# Patient Record
Sex: FEMALE | Race: WHITE | NOT HISPANIC OR LATINO | URBAN - METROPOLITAN AREA
[De-identification: names, ages, dates, MRNs, and addresses within clinical notes are randomized per-mention and may not be internally consistent; named-entity substitution may affect disease eponyms.]

---

## 2017-01-10 ENCOUNTER — ALLSCRIPTS OFFICE VISIT (OUTPATIENT)
Dept: OTHER | Facility: OTHER | Age: 47
End: 2017-01-10

## 2017-01-11 ENCOUNTER — ANESTHESIA EVENT (OUTPATIENT)
Dept: PERIOP | Facility: HOSPITAL | Age: 47
End: 2017-01-11
Payer: COMMERCIAL

## 2017-01-11 ENCOUNTER — HOSPITAL ENCOUNTER (OUTPATIENT)
Facility: HOSPITAL | Age: 47
Setting detail: OUTPATIENT SURGERY
Discharge: HOME | End: 2017-01-11
Attending: SPECIALIST | Admitting: SPECIALIST
Payer: COMMERCIAL

## 2017-01-11 ENCOUNTER — ANESTHESIA (OUTPATIENT)
Dept: PERIOP | Facility: HOSPITAL | Age: 47
End: 2017-01-11
Payer: COMMERCIAL

## 2017-01-11 VITALS
SYSTOLIC BLOOD PRESSURE: 108 MMHG | BODY MASS INDEX: 28.32 KG/M2 | TEMPERATURE: 97.4 F | DIASTOLIC BLOOD PRESSURE: 58 MMHG | HEIGHT: 61 IN | WEIGHT: 150 LBS | OXYGEN SATURATION: 100 % | HEART RATE: 71 BPM | RESPIRATION RATE: 18 BRPM

## 2017-01-11 DIAGNOSIS — L72.9 FOLLICULAR CYST OF SKIN: ICD-10-CM

## 2017-01-11 LAB — EXT PREGNANCY TEST URINE: NORMAL

## 2017-01-11 PROCEDURE — 87070 CULTURE OTHR SPECIMN AEROBIC: CPT | Performed by: SPECIALIST

## 2017-01-11 PROCEDURE — 87205 SMEAR GRAM STAIN: CPT | Performed by: SPECIALIST

## 2017-01-11 PROCEDURE — 87147 CULTURE TYPE IMMUNOLOGIC: CPT | Performed by: SPECIALIST

## 2017-01-11 PROCEDURE — 87077 CULTURE AEROBIC IDENTIFY: CPT | Performed by: SPECIALIST

## 2017-01-11 PROCEDURE — 81025 URINE PREGNANCY TEST: CPT | Performed by: SPECIALIST

## 2017-01-11 PROCEDURE — 87176 TISSUE HOMOGENIZATION CULTR: CPT | Performed by: SPECIALIST

## 2017-01-11 PROCEDURE — 87186 SC STD MICRODIL/AGAR DIL: CPT | Performed by: SPECIALIST

## 2017-01-11 PROCEDURE — 87075 CULTR BACTERIA EXCEPT BLOOD: CPT | Performed by: SPECIALIST

## 2017-01-11 RX ORDER — TERBINAFINE HYDROCHLORIDE 250 MG/1
250 TABLET ORAL DAILY
COMMUNITY

## 2017-01-11 RX ORDER — FENTANYL CITRATE/PF 50 MCG/ML
25 SYRINGE (ML) INJECTION
Status: DISCONTINUED | OUTPATIENT
Start: 2017-01-11 | End: 2017-01-11 | Stop reason: HOSPADM

## 2017-01-11 RX ORDER — COVID-19 ANTIGEN TEST
KIT MISCELLANEOUS
COMMUNITY

## 2017-01-11 RX ORDER — CEFADROXIL 500 MG/1
500 CAPSULE ORAL 2 TIMES DAILY
COMMUNITY

## 2017-01-11 RX ORDER — OXYCODONE HYDROCHLORIDE AND ACETAMINOPHEN 5; 325 MG/1; MG/1
1 TABLET ORAL EVERY 4 HOURS PRN
Status: DISCONTINUED | OUTPATIENT
Start: 2017-01-11 | End: 2017-01-11 | Stop reason: HOSPADM

## 2017-01-11 RX ORDER — ATORVASTATIN CALCIUM 10 MG/1
10 TABLET, FILM COATED ORAL DAILY
COMMUNITY

## 2017-01-11 RX ORDER — MIDAZOLAM HYDROCHLORIDE 1 MG/ML
INJECTION INTRAMUSCULAR; INTRAVENOUS AS NEEDED
Status: DISCONTINUED | OUTPATIENT
Start: 2017-01-11 | End: 2017-01-11 | Stop reason: SURG

## 2017-01-11 RX ORDER — OXYCODONE HYDROCHLORIDE AND ACETAMINOPHEN 5; 325 MG/1; MG/1
1 TABLET ORAL EVERY 4 HOURS PRN
Qty: 40 TABLET | Refills: 0 | Status: SHIPPED | OUTPATIENT
Start: 2017-01-11 | End: 2017-01-21

## 2017-01-11 RX ORDER — FENTANYL CITRATE 50 UG/ML
INJECTION, SOLUTION INTRAMUSCULAR; INTRAVENOUS AS NEEDED
Status: DISCONTINUED | OUTPATIENT
Start: 2017-01-11 | End: 2017-01-11 | Stop reason: SURG

## 2017-01-11 RX ORDER — BUPIVACAINE HYDROCHLORIDE 5 MG/ML
INJECTION, SOLUTION PERINEURAL AS NEEDED
Status: DISCONTINUED | OUTPATIENT
Start: 2017-01-11 | End: 2017-01-11 | Stop reason: HOSPADM

## 2017-01-11 RX ADMIN — OXYCODONE HYDROCHLORIDE AND ACETAMINOPHEN 1 TABLET: 5; 325 TABLET ORAL at 14:49

## 2017-01-11 RX ADMIN — CEFAZOLIN SODIUM 1000 MG: 1 SOLUTION INTRAVENOUS at 14:11

## 2017-01-11 RX ADMIN — MIDAZOLAM HYDROCHLORIDE 2 MG: 1 INJECTION, SOLUTION INTRAMUSCULAR; INTRAVENOUS at 14:00

## 2017-01-11 RX ADMIN — FENTANYL CITRATE 50 MCG: 50 INJECTION, SOLUTION INTRAMUSCULAR; INTRAVENOUS at 14:05

## 2017-01-11 RX ADMIN — FENTANYL CITRATE 50 MCG: 50 INJECTION, SOLUTION INTRAMUSCULAR; INTRAVENOUS at 14:00

## 2017-01-13 LAB — BACTERIA SPEC ANAEROBE CULT: NORMAL

## 2017-01-14 LAB
BACTERIA TISS AEROBE CULT: NORMAL
GRAM STN SPEC: NORMAL
GRAM STN SPEC: NORMAL

## 2017-01-16 ENCOUNTER — ALLSCRIPTS OFFICE VISIT (OUTPATIENT)
Dept: OTHER | Facility: OTHER | Age: 47
End: 2017-01-16

## 2017-01-23 ENCOUNTER — ALLSCRIPTS OFFICE VISIT (OUTPATIENT)
Dept: OTHER | Facility: OTHER | Age: 47
End: 2017-01-23

## 2017-02-10 ENCOUNTER — ALLSCRIPTS OFFICE VISIT (OUTPATIENT)
Dept: OTHER | Facility: OTHER | Age: 47
End: 2017-02-10

## 2017-02-23 ENCOUNTER — ALLSCRIPTS OFFICE VISIT (OUTPATIENT)
Dept: OTHER | Facility: OTHER | Age: 47
End: 2017-02-23

## 2017-04-14 ENCOUNTER — ALLSCRIPTS OFFICE VISIT (OUTPATIENT)
Dept: OTHER | Facility: OTHER | Age: 47
End: 2017-04-14

## 2017-04-20 ENCOUNTER — ALLSCRIPTS OFFICE VISIT (OUTPATIENT)
Dept: OTHER | Facility: OTHER | Age: 47
End: 2017-04-20

## 2017-05-18 ENCOUNTER — ALLSCRIPTS OFFICE VISIT (OUTPATIENT)
Dept: OTHER | Facility: OTHER | Age: 47
End: 2017-05-18

## 2017-05-22 ENCOUNTER — ALLSCRIPTS OFFICE VISIT (OUTPATIENT)
Dept: OTHER | Facility: OTHER | Age: 47
End: 2017-05-22

## 2017-06-15 ENCOUNTER — ALLSCRIPTS OFFICE VISIT (OUTPATIENT)
Dept: OTHER | Facility: OTHER | Age: 47
End: 2017-06-15

## 2018-01-12 VITALS
DIASTOLIC BLOOD PRESSURE: 88 MMHG | HEIGHT: 61 IN | BODY MASS INDEX: 29.07 KG/M2 | RESPIRATION RATE: 16 BRPM | SYSTOLIC BLOOD PRESSURE: 132 MMHG | WEIGHT: 154 LBS | HEART RATE: 72 BPM

## 2018-01-12 VITALS
HEIGHT: 61 IN | DIASTOLIC BLOOD PRESSURE: 86 MMHG | WEIGHT: 150 LBS | BODY MASS INDEX: 28.32 KG/M2 | RESPIRATION RATE: 16 BRPM | SYSTOLIC BLOOD PRESSURE: 139 MMHG | HEART RATE: 78 BPM

## 2018-01-13 VITALS
OXYGEN SATURATION: 99 % | HEART RATE: 98 BPM | BODY MASS INDEX: 28.42 KG/M2 | HEIGHT: 61 IN | TEMPERATURE: 98.1 F | WEIGHT: 150.5 LBS | SYSTOLIC BLOOD PRESSURE: 118 MMHG | DIASTOLIC BLOOD PRESSURE: 80 MMHG

## 2018-01-13 VITALS
HEIGHT: 61 IN | HEART RATE: 53 BPM | SYSTOLIC BLOOD PRESSURE: 118 MMHG | TEMPERATURE: 98.2 F | BODY MASS INDEX: 29.07 KG/M2 | WEIGHT: 154 LBS | OXYGEN SATURATION: 96 % | DIASTOLIC BLOOD PRESSURE: 72 MMHG

## 2018-01-14 VITALS
HEART RATE: 127 BPM | DIASTOLIC BLOOD PRESSURE: 86 MMHG | TEMPERATURE: 98.6 F | WEIGHT: 150.5 LBS | OXYGEN SATURATION: 99 % | SYSTOLIC BLOOD PRESSURE: 140 MMHG | BODY MASS INDEX: 28.42 KG/M2 | HEIGHT: 61 IN

## 2018-01-14 VITALS
DIASTOLIC BLOOD PRESSURE: 83 MMHG | BODY MASS INDEX: 29.07 KG/M2 | HEART RATE: 87 BPM | RESPIRATION RATE: 16 BRPM | WEIGHT: 154 LBS | HEIGHT: 61 IN | SYSTOLIC BLOOD PRESSURE: 133 MMHG

## 2018-01-14 VITALS
TEMPERATURE: 98 F | OXYGEN SATURATION: 97 % | DIASTOLIC BLOOD PRESSURE: 82 MMHG | HEIGHT: 61 IN | HEART RATE: 98 BPM | WEIGHT: 150.5 LBS | SYSTOLIC BLOOD PRESSURE: 136 MMHG | BODY MASS INDEX: 28.42 KG/M2

## 2018-01-15 VITALS
BODY MASS INDEX: 29.17 KG/M2 | HEIGHT: 61 IN | OXYGEN SATURATION: 97 % | DIASTOLIC BLOOD PRESSURE: 76 MMHG | SYSTOLIC BLOOD PRESSURE: 132 MMHG | HEART RATE: 87 BPM | WEIGHT: 154.5 LBS | TEMPERATURE: 98.6 F

## 2020-12-20 ENCOUNTER — HOSPITAL ENCOUNTER (EMERGENCY)
Facility: HOSPITAL | Age: 50
Discharge: HOME | End: 2020-12-20
Attending: EMERGENCY MEDICINE | Admitting: EMERGENCY MEDICINE
Payer: COMMERCIAL

## 2020-12-20 ENCOUNTER — APPOINTMENT (EMERGENCY)
Dept: RADIOLOGY | Facility: HOSPITAL | Age: 50
End: 2020-12-20
Payer: COMMERCIAL

## 2020-12-20 VITALS
SYSTOLIC BLOOD PRESSURE: 157 MMHG | RESPIRATION RATE: 18 BRPM | BODY MASS INDEX: 37.03 KG/M2 | HEART RATE: 95 BPM | DIASTOLIC BLOOD PRESSURE: 80 MMHG | TEMPERATURE: 98 F | OXYGEN SATURATION: 100 % | WEIGHT: 196 LBS

## 2020-12-20 DIAGNOSIS — S06.0X0A CONCUSSION WITHOUT LOSS OF CONSCIOUSNESS, INITIAL ENCOUNTER: Primary | ICD-10-CM

## 2020-12-20 DIAGNOSIS — S01.01XA LACERATION OF SCALP, INITIAL ENCOUNTER: ICD-10-CM

## 2020-12-20 PROCEDURE — 99283 EMERGENCY DEPT VISIT LOW MDM: CPT | Performed by: EMERGENCY MEDICINE

## 2020-12-20 PROCEDURE — 99284 EMERGENCY DEPT VISIT MOD MDM: CPT

## 2020-12-20 PROCEDURE — 12002 RPR S/N/AX/GEN/TRNK2.6-7.5CM: CPT | Performed by: EMERGENCY MEDICINE

## 2020-12-20 PROCEDURE — G1004 CDSM NDSC: HCPCS

## 2020-12-20 PROCEDURE — 90471 IMMUNIZATION ADMIN: CPT

## 2020-12-20 PROCEDURE — 90715 TDAP VACCINE 7 YRS/> IM: CPT | Performed by: EMERGENCY MEDICINE

## 2020-12-20 PROCEDURE — 70450 CT HEAD/BRAIN W/O DYE: CPT

## 2020-12-20 RX ORDER — LIDOCAINE HYDROCHLORIDE AND EPINEPHRINE 10; 10 MG/ML; UG/ML
5 INJECTION, SOLUTION INFILTRATION; PERINEURAL ONCE
Status: COMPLETED | OUTPATIENT
Start: 2020-12-20 | End: 2020-12-20

## 2020-12-20 RX ORDER — NORTRIPTYLINE HYDROCHLORIDE 50 MG/1
50 CAPSULE ORAL
COMMUNITY

## 2020-12-20 RX ORDER — HYDROCODONE BITARTRATE AND ACETAMINOPHEN 5; 325 MG/1; MG/1
1 TABLET ORAL ONCE
Status: COMPLETED | OUTPATIENT
Start: 2020-12-20 | End: 2020-12-20

## 2020-12-20 RX ORDER — TOPIRAMATE 25 MG/1
25 TABLET ORAL 2 TIMES DAILY
COMMUNITY

## 2020-12-20 RX ADMIN — HYDROCODONE BITARTRATE AND ACETAMINOPHEN 1 TABLET: 5; 325 TABLET ORAL at 18:54

## 2020-12-20 RX ADMIN — LIDOCAINE HYDROCHLORIDE,EPINEPHRINE BITARTRATE 5 ML: 10; .01 INJECTION, SOLUTION INFILTRATION; PERINEURAL at 18:08

## 2020-12-20 RX ADMIN — TETANUS TOXOID, REDUCED DIPHTHERIA TOXOID AND ACELLULAR PERTUSSIS VACCINE, ADSORBED 0.5 ML: 5; 2.5; 8; 8; 2.5 SUSPENSION INTRAMUSCULAR at 18:02

## 2021-02-10 ENCOUNTER — TRANSCRIBE ORDERS (OUTPATIENT)
Dept: ADMINISTRATIVE | Facility: HOSPITAL | Age: 51
End: 2021-02-10

## 2021-02-10 DIAGNOSIS — R58 BLEEDING: Primary | ICD-10-CM

## 2021-02-10 DIAGNOSIS — N93.9 VAGINAL BLEEDING: ICD-10-CM

## 2021-02-11 ENCOUNTER — HOSPITAL ENCOUNTER (OUTPATIENT)
Dept: RADIOLOGY | Facility: HOSPITAL | Age: 51
Discharge: HOME | End: 2021-02-11
Payer: COMMERCIAL

## 2021-02-11 DIAGNOSIS — N93.9 VAGINAL BLEEDING: ICD-10-CM

## 2021-02-11 PROCEDURE — 76830 TRANSVAGINAL US NON-OB: CPT

## 2021-02-11 PROCEDURE — 76856 US EXAM PELVIC COMPLETE: CPT

## 2023-06-13 ENCOUNTER — TELEPHONE (OUTPATIENT)
Dept: NEUROLOGY | Facility: CLINIC | Age: 53
End: 2023-06-13

## 2023-06-28 ENCOUNTER — OFFICE VISIT (OUTPATIENT)
Dept: NEUROLOGY | Facility: CLINIC | Age: 53
End: 2023-06-28
Payer: COMMERCIAL

## 2023-06-28 VITALS
BODY MASS INDEX: 31.75 KG/M2 | TEMPERATURE: 97.6 F | DIASTOLIC BLOOD PRESSURE: 70 MMHG | HEART RATE: 92 BPM | OXYGEN SATURATION: 97 % | HEIGHT: 61 IN | SYSTOLIC BLOOD PRESSURE: 118 MMHG | WEIGHT: 168.2 LBS

## 2023-06-28 DIAGNOSIS — G44.86 CERVICOGENIC HEADACHE: Primary | ICD-10-CM

## 2023-06-28 DIAGNOSIS — G43.009 ATYPICAL MIGRAINE: ICD-10-CM

## 2023-06-28 DIAGNOSIS — M50.30 DDD (DEGENERATIVE DISC DISEASE), CERVICAL: ICD-10-CM

## 2023-06-28 DIAGNOSIS — M25.552 LEFT HIP PAIN: ICD-10-CM

## 2023-06-28 PROCEDURE — 99205 OFFICE O/P NEW HI 60 MIN: CPT | Performed by: PSYCHIATRY & NEUROLOGY

## 2023-06-28 RX ORDER — ERENUMAB-AOOE 140 MG/ML
140 INJECTION, SOLUTION SUBCUTANEOUS
Qty: 1 ML | Refills: 3 | Status: SHIPPED | OUTPATIENT
Start: 2023-06-28

## 2023-06-28 RX ORDER — DULOXETINE 40 MG/1
CAPSULE, DELAYED RELEASE ORAL
COMMUNITY
Start: 2023-06-08

## 2023-06-28 NOTE — PROGRESS NOTES
Outpatient Neurology History and Physical  Dexter Marx  [id number]  47 y o   1970          Consult: Yes    No primary care provider on file  Chief Complaint   Patient presents with   • Tension headache           History Obtained from: patient     HPI:       Dexter Marx is a 48 yo F that presents with cc of tension headaches  She describes her headache as originating from base of neck to all over head  Pain is pressure type with shooting quality in between  There is no associated nausea, vomiting  There is no associated light or noise sensitivity  She also reports associated postural dizziness  When she bends down, her headache exaggerates  She reports headache on over 15 days a month  She has been on topamax for over 4 years and is not effective anymore  Denies any food or smells as triggers  She denies any extremity paresthesia or weakness  She has done PT for 6 months without much benefit  Says she has tried muscle relaxants in past and doesn't remember them to be effective  She's had epidural injections in past but says they were ineffective  She can't remember when the last time she had cervical spine MRI  She's had MRI brain and MRA and both were normal      She's on sinemet for RLS  She is on pamelor 50mg qhs and duloxetine 40mg daily but is unsure why she's on them  Her mother had migraines  Her maternal aunt had migraines  She also reports left hip referred pain in left groin         Past Medical History:   Diagnosis Date   • Chronic pain     head and neck    • Tension headache                Current Outpatient Medications on File Prior to Visit   Medication Sig Dispense Refill   • carbidopa-levodopa (SINEMET)  mg per tablet Take 1 tablet by mouth daily     • cefadroxil (DURICEF) 500 mg capsule Take 500 mg by mouth 2 (two) times a day     • DULoxetine HCl 40 MG CPEP      • Naproxen Sodium (ALEVE) 220 MG CAPS Take by mouth if needed     • nortriptyline (PAMELOR) 50 mg capsule Take 50 mg by mouth daily at bedtime     • topiramate (TOPAMAX) 200 MG tablet Take 200 mg by mouth daily     • atorvastatin (LIPITOR) 10 mg tablet Take 10 mg by mouth daily     • ciclopirox (LOPROX) 0 77 % cream Apply topically 2 (two) times a day     • terbinafine (LamISIL) 250 mg tablet Take 250 mg by mouth daily     • topiramate (TOPAMAX) 25 mg tablet Take 25 mg by mouth 2 (two) times a day (Patient not taking: Reported on 6/28/2023)       No current facility-administered medications on file prior to visit         No Known Allergies      Family History   Problem Relation Age of Onset   • Dementia Mother    • No Known Problems Father                 Past Surgical History:   Procedure Laterality Date   • PILONIDAL CYST DRAINAGE     • HI I&D SOFT TISSUE ABSCESS SUBFASCIAL N/A 1/11/2017    Procedure: INCISION AND DRAINAGE (I&D) BUTTOCK;  Surgeon: Yves Villarreal MD;  Location: [address] [place] [address];  Service: General   • SHOULDER ARTHROSCOPY W/ ROTATOR CUFF REPAIR Left            Social History     Socioeconomic History   • Marital status: /Civil Union     Spouse name: Not on file   • Number of children: Not on file   • Years of education: Not on file   • Highest education level: Not on file   Occupational History   • Not on file   Tobacco Use   • Smoking status: Former     Packs/day: 0 50     Years: 20 00     Total pack years: 10 00     Types: Cigarettes   • Smokeless tobacco: Never   Vaping Use   • Vaping Use: Every day   • Substances: Flavoring   Substance and Sexual Activity   • Alcohol use: No   • Drug use: No   • Sexual activity: Not on file   Other Topics Concern   • Not on file   Social History Narrative   • Not on file     Social Determinants of Health     Financial Resource Strain: Not on file   Food Insecurity: Not on file   Transportation Needs: Not on file   Physical Activity: Not on file   Stress: Not on file   Social Connections: Not on file   Intimate Partner Violence: Not on file   Housing Stability: "Not on file       Review of Systems  Refer to positive review of systems in HPI  Constitutional- No fever  Eyes- No visual change  ENT- Hearing normal  CV- No chest pain  Resp- No Shortness of breath  GI- No diarrhea  - Bladder normal  MS- No Arthritis   Skin- No rash  Psych- No depression  Endo- No DM  Heme- No nodes    PHYSICAL EXAM:    Vitals:    06/28/23 1459   BP: 118/70   BP Location: Left arm   Patient Position: Sitting   Cuff Size: Standard   Pulse: 92   Temp: 97 6 °F (36 4 °C)   TempSrc: Tympanic   SpO2: 97%   Weight: 76 3 kg (168 lb 3 2 oz)   Height: 5' 1\" (1 549 m)         Appearance: No Acute Distress  Ophthalmoscopic: Disc Flat, Normal fundus  Carotid/Heart/Peripheral Vascular: No Bruits, RRR  Orientation: Awake, Alert, and Oriented x 3  Mental status:  Memory: Registation 3/3 Recall 3/3  Attention: Normal  Knowledge: Appropriate  Language: No aphasia  Speech: No dysarthria  Cranial Nerves:  2 No Visual Defect on Confrontation; Pupils round, equal, reactive to light  3,4,6 Extraocular Movements Intact; no nystagmus  5 Facial Sensation Intact  7 No facial asymmetry  8 Intact hearing  9,10 Palate symmetric, normal gag  11 Good shoulder shrug  12 Tongue Midline  Gait: Stable, No ataxia, can perform tandem walking  Coordination: No ataxia with finger to nose testing and heel to shin testing  Sensory: Intact, Symmetric to Pinprick, Light Touch, Vibration, and Joint Position  Muscle Tone: Normal  Muscle exam  Arm Right Left Leg Right Left   Deltoid 5/5 5/5 Iliopsoas 5/5 5/5   Biceps 5/5 5/5 Quads 5/5 5/5   Triceps 5/5 5/5 Hamstrings 5/5 5/5   Wrist Extension 5/5 5/5 Ankle Dorsi Flexion 5/5 5/5   Wrist Flexion 5/5 5/5 Ankle Plantar Flexion 5/5 5/5   Interossei 5/5 5/5 Ankle Eversion 5/5 5/5   APB 5/5 5/5 Ankle Inversion 5/5 5/5       Reflexes   RJ BJ TJ KJ AJ Plantars Sauceda's   Right 2+ 2+ 2+ 2+ 2+ Downgoing Not present   Left 2+ 2+ 2+ 2+ 2+ Downgoing Not present         Personal review of        Mri brain " and mra    Assessment/Plan:     1  Cervicogenic headache  MRI cervical spine wo contrast      2  DDD (degenerative disc disease), cervical  MRI cervical spine wo contrast      3  Atypical migraine  Erenumab-aooe (Aimovig) 140 MG/ML SOAJ      4  Left hip pain  XR hip/pelv 2-3 vws left if performed          Patient suffers from frequent headaches that are not quite migraine like but are disabling  She's already on topamax and pamelor which are not effective  Will place her on aimovig monthly  Will obtain MRI cervical spine to r/o any pathology of neck  For her recent left hip pain, will obtain x ray  Counseling Documentation:  The patient and/or patient's family were  counseled regarding diagnostic results  Instructions for management,risk factor reductions,prognosis of disease were discussed  Patient and family were educated regarding impressions,risks and benefits of treatment options,importance of compliance with treatment  Total time of encounter: 60 min   More than 50% of time was spent in counseling and coordination of care of patient  FLORIAN Carbajal    [facility]  [phone]  [place] [address]

## 2023-06-28 NOTE — LETTER
June 28, 2023     Tj Thompson, [address]    Patient: Daniela Diaz   YOB: 1970   Date of Visit: 6/28/2023       Dear Dr Myriam Negron: Thank you for referring Daniela Diaz to me for evaluation  Below are my notes for this consultation  If you have questions, please do not hesitate to call me  I look forward to following your patient along with you  Sincerely,        Lynne Zambrano MD        CC: No Recipients    Lynne Zambrano MD  6/28/2023  3:37 PM  Incomplete  Outpatient Neurology History and Physical  Daniela Diaz  [id number]  47 y o   1970          Consult: Yes    No primary care provider on file  Chief Complaint   Patient presents with   • Tension headache           History Obtained from: patient     HPI:       Daniela Diaz is a 46 yo F that presents with cc of tension headaches  She describes her headache as originating from base of neck to all over head  Pain is pressure type with shooting quality in between  There is no associated nausea, vomiting  There is no associated light or noise sensitivity  She also reports associated postural dizziness  When she bends down, her headache exaggerates  She reports headache on over 15 days a month  She has been on topamax for over 4 years and is not effective anymore  Denies any food or smells as triggers  She denies any extremity paresthesia or weakness  She has done PT for 6 months without much benefit  Says she has tried muscle relaxants in past and doesn't remember them to be effective  She's had epidural injections in past but says they were ineffective  She can't remember when the last time she had cervical spine MRI  She's had MRI brain and MRA and both were normal      She's on sinemet for RLS  She is on pamelor 50mg qhs and duloxetine 40mg daily but is unsure why she's on them  Her mother had migraines  Her maternal aunt had migraines       She also reports left hip referred pain in left groin  Past Medical History:   Diagnosis Date   • Chronic pain     head and neck    • Tension headache                Current Outpatient Medications on File Prior to Visit   Medication Sig Dispense Refill   • carbidopa-levodopa (SINEMET)  mg per tablet Take 1 tablet by mouth daily     • cefadroxil (DURICEF) 500 mg capsule Take 500 mg by mouth 2 (two) times a day     • DULoxetine HCl 40 MG CPEP      • Naproxen Sodium (ALEVE) 220 MG CAPS Take by mouth if needed     • nortriptyline (PAMELOR) 50 mg capsule Take 50 mg by mouth daily at bedtime     • topiramate (TOPAMAX) 200 MG tablet Take 200 mg by mouth daily     • atorvastatin (LIPITOR) 10 mg tablet Take 10 mg by mouth daily     • ciclopirox (LOPROX) 0 77 % cream Apply topically 2 (two) times a day     • terbinafine (LamISIL) 250 mg tablet Take 250 mg by mouth daily     • topiramate (TOPAMAX) 25 mg tablet Take 25 mg by mouth 2 (two) times a day (Patient not taking: Reported on 6/28/2023)       No current facility-administered medications on file prior to visit         No Known Allergies      Family History   Problem Relation Age of Onset   • Dementia Mother    • No Known Problems Father                 Past Surgical History:   Procedure Laterality Date   • PILONIDAL CYST DRAINAGE     • MO I&D SOFT TISSUE ABSCESS SUBFASCIAL N/A 1/11/2017    Procedure: INCISION AND DRAINAGE (I&D) BUTTOCK;  Surgeon: Den Amaral MD;  Location: [address] [place] [address];  Service: General   • SHOULDER ARTHROSCOPY W/ ROTATOR CUFF REPAIR Left            Social History     Socioeconomic History   • Marital status: /Civil Union     Spouse name: Not on file   • Number of children: Not on file   • Years of education: Not on file   • Highest education level: Not on file   Occupational History   • Not on file   Tobacco Use   • Smoking status: Former     Packs/day: 0 50     Years: 20 00     Total pack years: 10 00     Types: Cigarettes   • Smokeless tobacco: Never   Vaping Use "  • Vaping Use: Every day   • Substances: Flavoring   Substance and Sexual Activity   • Alcohol use: No   • Drug use: No   • Sexual activity: Not on file   Other Topics Concern   • Not on file   Social History Narrative   • Not on file     Social Determinants of Health     Financial Resource Strain: Not on file   Food Insecurity: Not on file   Transportation Needs: Not on file   Physical Activity: Not on file   Stress: Not on file   Social Connections: Not on file   Intimate Partner Violence: Not on file   Housing Stability: Not on file       Review of Systems  Refer to positive review of systems in HPI  Constitutional- No fever  Eyes- No visual change  ENT- Hearing normal  CV- No chest pain  Resp- No Shortness of breath  GI- No diarrhea  - Bladder normal  MS- No Arthritis   Skin- No rash  Psych- No depression  Endo- No DM  Heme- No nodes    PHYSICAL EXAM:    Vitals:    06/28/23 1459   BP: 118/70   BP Location: Left arm   Patient Position: Sitting   Cuff Size: Standard   Pulse: 92   Temp: 97 6 °F (36 4 °C)   TempSrc: Tympanic   SpO2: 97%   Weight: 76 3 kg (168 lb 3 2 oz)   Height: 5' 1\" (1 549 m)         Appearance: No Acute Distress  Ophthalmoscopic: Disc Flat, Normal fundus  Carotid/Heart/Peripheral Vascular: No Bruits, RRR  Orientation: Awake, Alert, and Oriented x 3  Mental status:  Memory: Registation 3/3 Recall 3/3  Attention: Normal  Knowledge: Appropriate  Language: No aphasia  Speech: No dysarthria  Cranial Nerves:  2 No Visual Defect on Confrontation; Pupils round, equal, reactive to light  3,4,6 Extraocular Movements Intact; no nystagmus  5 Facial Sensation Intact  7 No facial asymmetry  8 Intact hearing  9,10 Palate symmetric, normal gag  11 Good shoulder shrug  12 Tongue Midline  Gait: Stable, No ataxia, can perform tandem walking  Coordination: No ataxia with finger to nose testing and heel to shin testing  Sensory: Intact, Symmetric to Pinprick, Light Touch, Vibration, and Joint Position  Muscle " Tone: Normal  Muscle exam  Arm Right Left Leg Right Left   Deltoid 5/5 5/5 Iliopsoas 5/5 5/5   Biceps 5/5 5/5 Quads 5/5 5/5   Triceps 5/5 5/5 Hamstrings 5/5 5/5   Wrist Extension 5/5 5/5 Ankle Dorsi Flexion 5/5 5/5   Wrist Flexion 5/5 5/5 Ankle Plantar Flexion 5/5 5/5   Interossei 5/5 5/5 Ankle Eversion 5/5 5/5   APB 5/5 5/5 Ankle Inversion 5/5 5/5       Reflexes   RJ BJ TJ KJ AJ Plantars Sauceda's   Right 2+ 2+ 2+ 2+ 2+ Downgoing Not present   Left 2+ 2+ 2+ 2+ 2+ Downgoing Not present         Personal review of        Mri brain and mra    Assessment/Plan:     1  Cervicogenic headache  MRI cervical spine wo contrast      2  DDD (degenerative disc disease), cervical  MRI cervical spine wo contrast      3  Atypical migraine  Erenumab-aooe (Aimovig) 140 MG/ML SOAJ      4  Left hip pain  XR hip/pelv 2-3 vws left if performed          Patient suffers from frequent headaches that are not quite migraine like but are disabling  She's already on topamax and pamelor which are not effective  Will place her on aimovig monthly  Will obtain MRI cervical spine to r/o any pathology of neck  For her recent left hip pain, will obtain x ray  Counseling Documentation:  The patient and/or patient's family were  counseled regarding diagnostic results  Instructions for management,risk factor reductions,prognosis of disease were discussed  Patient and family were educated regarding impressions,risks and benefits of treatment options,importance of compliance with treatment  Total time of encounter: 60 min   More than 50% of time was spent in counseling and coordination of care of patient  FLORIAN Crawford    [facility]  [address]  [place] [address]

## 2023-07-03 ENCOUNTER — TELEPHONE (OUTPATIENT)
Dept: NEUROLOGY | Facility: CLINIC | Age: 53
End: 2023-07-03

## 2023-07-03 NOTE — TELEPHONE ENCOUNTER
My name is Rubi Esparza. I'm a patient of Dr Herlinda Euceda. I was in to see her last week and she had ordered a MRI of the neck. And I was just in my chart and I thought that I had had one done. I just had one done april 12th of 23. So I don't see that I need to have another one done. And also I want to know what 2 medicines. If I'm supposed to stop taking a to for the headaches as I started I should stop or continue to take them. if someone could return my call. Thank you. Bye.  [phone]    Chart reviewed  MRI brain was done on 4/12/23  MRA neck was done on 4/12/23  MRI cervical was ordered by Dr Holley Angelucci on 6/28/23    Called pt to let her know that I received her message. Advised of the above. She verbalized understanding. Her MRI cervical is scheduled on 7/27/23. Also, states that she started Aimovig on 7/1/23. Pt takes Topamax 200 mg 1 tab daily at bedtime and Duloxetine 40 mg daily. She does not feel that these meds are working. She wants to know if she should continue taking topamax and duloxetine?     Cb [phone], ok to leave detailed message

## 2023-07-06 NOTE — TELEPHONE ENCOUNTER
Called and advised pt of all of the below. She verbalized clear understanding of all instructions. Jovana Majano MD  to Me        7/3/23 12:30 PM  Mri cervical is different from mra neck. So yes keep scheduled appt for mri cervical spine. I would wait 2-3 months for aimovig to work before tapering other ones as if we do both at the same time, headaches will get worse.

## 2023-07-27 ENCOUNTER — HOSPITAL ENCOUNTER (OUTPATIENT)
Dept: RADIOLOGY | Facility: HOSPITAL | Age: 53
Discharge: HOME | End: 2023-07-27
Attending: PSYCHIATRY & NEUROLOGY
Payer: COMMERCIAL

## 2023-07-27 ENCOUNTER — HOSPITAL ENCOUNTER (OUTPATIENT)
Dept: RADIOLOGY | Facility: HOSPITAL | Age: 53
Discharge: HOME | End: 2023-07-27
Payer: COMMERCIAL

## 2023-07-27 DIAGNOSIS — G44.86 CERVICOGENIC HEADACHE: ICD-10-CM

## 2023-07-27 DIAGNOSIS — M25.552 LEFT HIP PAIN: ICD-10-CM

## 2023-07-27 DIAGNOSIS — M50.30 DDD (DEGENERATIVE DISC DISEASE), CERVICAL: ICD-10-CM

## 2023-07-27 PROCEDURE — 72141 MRI NECK SPINE W/O DYE: CPT

## 2023-07-27 PROCEDURE — G1004 CDSM NDSC: HCPCS

## 2023-07-27 PROCEDURE — 73502 X-RAY EXAM HIP UNI 2-3 VIEWS: CPT

## 2023-07-31 ENCOUNTER — TELEPHONE (OUTPATIENT)
Dept: NEUROLOGY | Facility: CLINIC | Age: 53
End: 2023-07-31

## 2023-07-31 DIAGNOSIS — M54.12 CERVICAL RADICULOPATHY: Primary | ICD-10-CM

## 2023-07-31 NOTE — TELEPHONE ENCOUNTER
Spoke to the patient and informed her of her MRI results      ----- Message from Duy Dawkins MD sent at 7/28/2023  3:09 PM EDT -----  Please call the patient regarding her abnormal result. There are  arthritic changes including small disc bulges at C4-6 in her neck. This would be treated with PT for neck and pain management if pain is moderate to severe. The patient states that she has already completed PT for her neck and is requesting a referral to the pain management doctor.

## 2023-08-02 NOTE — TELEPHONE ENCOUNTER
Patient left  regarding results of Hip Xray and to please call back. # [phone]      I called patient back and let her know final report is not back yet. And our office will call once results are back and reviewed by Dr. Yudith Beavers. Patient said she was called about her MRI C/S but doesn't know why her results haven't been posted on My Chart. Patient was called on 7/31/23 about results.

## 2023-08-09 ENCOUNTER — TELEPHONE (OUTPATIENT)
Dept: NEUROLOGY | Facility: CLINIC | Age: 53
End: 2023-08-09

## 2023-08-09 NOTE — TELEPHONE ENCOUNTER
Spoke to the patient and informed her of her XR results, the patient clarified understanding the result with no additional questions.      ----- Message from [address] [place] [address] sent at 8/8/2023  7:37 PM EDT -----  Please call the patient regarding her result. No abnormal findings were noted on pt's hip xrays.     Thanks  [email or web address]

## 2023-08-10 DIAGNOSIS — M54.12 CERVICAL RADICULOPATHY: Primary | ICD-10-CM

## 2023-08-10 NOTE — TELEPHONE ENCOUNTER
Patient called in regarding pain management referral. Haylee Wayne is no longer practicing and she will be seeing [facility]. She will require new referral to see him. Call returned to patient, [phone]. Acknowledged voicemail received and will forward to provider. Please enter new referral for pain management, Dr.Kyle Neha Elias.

## 2023-09-05 ENCOUNTER — CONSULT (OUTPATIENT)
Dept: PAIN MEDICINE | Facility: CLINIC | Age: 53
End: 2023-09-05
Payer: COMMERCIAL

## 2023-09-05 VITALS
BODY MASS INDEX: 37.22 KG/M2 | HEART RATE: 87 BPM | DIASTOLIC BLOOD PRESSURE: 78 MMHG | WEIGHT: 197 LBS | TEMPERATURE: 98.2 F | SYSTOLIC BLOOD PRESSURE: 140 MMHG

## 2023-09-05 DIAGNOSIS — M54.12 CERVICAL RADICULOPATHY: ICD-10-CM

## 2023-09-05 PROCEDURE — 99204 OFFICE O/P NEW MOD 45 MIN: CPT | Performed by: PHYSICAL MEDICINE & REHABILITATION

## 2023-09-05 RX ORDER — MELOXICAM 15 MG/1
TABLET ORAL
COMMUNITY
Start: 2023-08-22

## 2023-09-05 NOTE — PROGRESS NOTES
Pain Medicine Follow-Up Note    Assessment:  1. Cervical radiculopathy        Plan:  No orders of the defined types were placed in this encounter. New Medications Ordered This Visit   Medications   • meloxicam (MOBIC) 15 mg tablet       Ms. Shin van a pleasant 65-year-old female presents to [facility] for initial evaluation regarding 3 years duration of neck pain with radiating symptoms into the right worse than left upper extremity intermittently. During today's evaluation she is demonstrating clinical and diagnostic evidence of cervical radiculopathy likely in relation to the previous MRI findings of C4-C5 mild spinal stenosis. She does also appear to have some axial component to her neck pain but she reports the worst symptoms are the radicular symptoms into her upper extremities with associated neck pain. At this time she is already done conservative measures with physical therapy with varying degrees of relief. Interventional approaches will be beneficial and warranted. As such we will order a cervical epidural start injection or fluoroscopy guidance. All questions answered, patient is agreeable with plan. Complete risks and benefits including bleeding, infection, tissue reaction, nerve injury and allergic reaction were discussed. The approach was demonstrated using models and literature was provided. Verbal and written consent was obtained. History of Present Illness:    Kaykay Coronel is a 47 y.o. female who presents to [address] [facility] for interval re-evaluation of the above stated pain complaints. The patient has a past medical and chronic pain history as outlined in the assessment section. Patient presents for initial evaluation regarding 3 years duration of neck and bilateral shoulder pain. 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   Today reports moderate to severe pain rated 6-7 out of 10 and interfere with activities. Pain is nearly constant 60 to 95% of the time that is present throughout the day and night. Describes symptoms as shooting, spasming. Denies any significant upper extremity weakness or dropping objects. Does not use any durable medical equipment ambulation. Symptoms are worse with lying down and bending as well as relaxation and coughing. Reports no significant relief with cervical traction, physical therapy, exercise, heat. Denies smoking or marijuana use. Admits to alcohol 2 times per month. Currently using naproxen with minimal relief. Presents today for initial evaluation. Other than as stated above, the patient denies any interval changes in medications, medical condition, mental condition, symptoms, or allergies since the last office visit. Review of Systems:    Review of Systems   Musculoskeletal: Positive for neck pain and neck stiffness. Joint pain,    Neurological: Positive for dizziness, light-headedness and headaches. Psychiatric/Behavioral: Positive for sleep disturbance.          Past Medical History:   Diagnosis Date   • Chronic pain     head and neck    • Tension headache        Past Surgical History:   Procedure Laterality Date   • PILONIDAL CYST DRAINAGE     • ND I&D SOFT TISSUE ABSCESS SUBFASCIAL N/A 1/11/2017    Procedure: INCISION AND DRAINAGE (I&D) BUTTOCK;  Surgeon: Merribeth Mohs, MD;  Location: [facility];  Service: General   • SHOULDER ARTHROSCOPY W/ ROTATOR CUFF REPAIR Left        Family History   Problem Relation Age of Onset   • Dementia Mother    • No Known Problems Father        Social History     Occupational History   • Not on file   Tobacco Use   • Smoking status: Former     Packs/day: 0.50     Years: 20.00     Total pack years: 10.00     Types: Cigarettes   • Smokeless tobacco: Never   Vaping Use   • Vaping Use: Every day   • Substances: Flavoring   Substance and Sexual Activity   • Alcohol use: No   • Drug use: No   • Sexual activity: Not on file         Current Outpatient Medications:   •  carbidopa-levodopa (SINEMET)  mg per tablet, Take 1 tablet by mouth daily, Disp: , Rfl:   •  cefadroxil (DURICEF) 500 mg capsule, Take 500 mg by mouth 2 (two) times a day, Disp: , Rfl:   •  DULoxetine HCl 40 MG CPEP, , Disp: , Rfl:   •  Erenumab-aooe (Aimovig) 140 MG/ML SOAJ, Inject 140 mg under the skin every 30 (thirty) days, Disp: 1 mL, Rfl: 3  •  meloxicam (MOBIC) 15 mg tablet, , Disp: , Rfl:   •  Naproxen Sodium (ALEVE) 220 MG CAPS, Take by mouth if needed, Disp: , Rfl:   •  nortriptyline (PAMELOR) 50 mg capsule, Take 50 mg by mouth daily at bedtime, Disp: , Rfl:   •  terbinafine (LamISIL) 250 mg tablet, Take 250 mg by mouth daily, Disp: , Rfl:   •  topiramate (TOPAMAX) 200 MG tablet, Take 200 mg by mouth daily, Disp: , Rfl:   •  atorvastatin (LIPITOR) 10 mg tablet, Take 10 mg by mouth daily, Disp: , Rfl:   •  ciclopirox (LOPROX) 0.77 % cream, Apply topically 2 (two) times a day, Disp: , Rfl:   •  topiramate (TOPAMAX) 25 mg tablet, Take 25 mg by mouth 2 (two) times a day (Patient not taking: Reported on 6/28/2023), Disp: , Rfl:     No Known Allergies    Physical Exam:    /78   Pulse 87   Temp 98.2 °F (36.8 °C)   Wt 89.4 kg (197 lb)   BMI 37.22 kg/m²     Constitutional:normal, well developed, well nourished, alert, in no distress and non-toxic and no overt pain behavior.   Eyes:anicteric  HEENT:grossly intact  Neck:supple, symmetric, trachea midline and no masses   Pulmonary:even and unlabored  Cardiovascular:No edema or pitting edema present  Skin:Normal without rashes or lesions and well hydrated  Psychiatric:Mood and affect appropriate  Neurologic:Cranial Nerves II-XII grossly intact  Musculoskeletal:normal, tenderness to palpation bilateral cervical paraspinals, decreased active and passive range of motion with cervical flexion and extension limited by pain, MMT 5 out of 5 bilateral upper extremities, sensation gross intact light touch, DTRs within normal limits, positive Spurling with radicular symptoms of the right arm      Imaging   MRI cervical spine wo contrast  Status: Final result     PACS Images     Show images for MRI cervical spine wo contrast  Study Result    Narrative & Impression   MRI CERVICAL SPINE WITHOUT CONTRAST     INDICATION: G44.86: Cervicogenic headache  M50.30: Other cervical disc degeneration, unspecified cervical region.     COMPARISON: No pertinent priors.     TECHNIQUE:  Multiplanar, multisequence imaging of the cervical spine was performed. .        IMAGE QUALITY:  Diagnostic     FINDINGS:     ALIGNMENT: Mild straightening of the normal cervical lordosis. No compression fracture. No subluxation. No scoliosis.     MARROW SIGNAL: Degenerative marrow signal is noted. No discrete marrow lesion.     CERVICAL AND VISUALIZED THORACIC CORD:  Normal signal within the visualized cord.     PREVERTEBRAL AND PARASPINAL SOFT TISSUES:  Normal.     VISUALIZED POSTERIOR FOSSA:  The visualized posterior fossa demonstrates no abnormal signal.     CERVICAL DISC SPACES: There is generalized loss of normal disc signal intensity.  There is disc space narrowing at C4-C5 C5-C6 and C6-C7.     C2-C3:  Normal.     C3-C4: Small disc bulge without significant central or foraminal stenosis.     C4-C5: Disc bulge with uncovertebral hypertrophy contributing to a mild central canal stenosis with mild to moderate right neural foraminal stenosis.     C5-C6: Small disc protrusion causing mild to moderate central canal stenosis.     C6-C7:  Normal.     C7-T1:  Normal.     UPPER THORACIC DISC SPACES:  Normal.     OTHER FINDINGS:  None.     IMPRESSION:     Degenerative changes of the spine with stenoses at C4-C5 and C5-C6 as detailed above.              Workstation performed: [id number]        Imaging    MRI cervical spine wo contrast (Order: [id number]) - 7/27/2023    Result History    MRI cervical spine wo contrast (Order #[id number]) on 7/28/2023 - Order Result History Report    Order Report     Order Details  Order Questions    Question Answer   What is the patient's sedation requirement? If Medication for Claustrophobia is selected, order medication at this point. No Sedation   Pregnant? No   Metallic implants? No   Note: Answer Yes or No   Does this procedure require the 3T MRI at [facility] or [place]? No   Release to patient through [address] [place] [address] Immediate   Is order priority selected as STAT? No   Exam reason neck pain. Note: Enter reason for exam   Test performed in 2 weeks            Result Information    Status Priority Source   Final result (7/28/2023  1:38 AM) Routine      Reason for Exam    neck pain.; Neck pain, chronic; neck pain. Dx: Cervicogenic headache [G44.86 (ICD-10-CM)]; DDD (degenerative disc disease), cervical [M50.30 (ICD-10-CM)]       All Reviewers List    Toma Cruz on 7/31/2023  9:13 AM   Debora Crespo MD on 7/28/2023  3:09 PM         MRI cervical spine wo contrast: Patient Communication     Add Comments   Seen         Signed by    Signed Date/Time  Phone Pager   Scott Isauro 7/28/2023 01:38 [phone]        Exam Information    Status Exam Begun  Exam Ended  Performing Tech   Final [99] 7/27/2023 17:17 7/27/2023 17:34 Alcides Monterroso       Screening Form Questions     important suggestion  Questionnaires are displayed in the order they were answered. Answer Comment   Has patient changed into the appropriate hospital gown? Yes    What are your symptoms? Chronic tension headaches    Do you have a cardiac pacemaker or internal defibrillator? NA    Please list /make/model:     Have you had any HEART surgery? None    Please list make/model:     Heart surgery: If "other", please describe:     Have you had any BRAIN surgery? None    Please list  or describe implant:     Brain surgery: If "other", please describe:     Have you had any EYE surgery?  None    Eye surgery: If "other", please describe: Have you ever injured your eyes with metal or metal fragments (grinding,metallic slivers)? No    Eye injury: Please describe:     Have you had any EAR surgery? None    Ear surgery: If "other", please describe:      Do you have an electronic "stimulation" device? None    Please provide  information:     Have you had any abdominal or pelvic surgery? No    Have you had any of the following abdominal or pelvic surgeries:     Abdominal/pelvic surgery: If "other", please describe:     Do you have any ORTHOPEDIC implants/devices? None    Do you have the following: Tattoos (eyeliner included)    Do you have liver disease? None    Do you have kidney disease? None    Have you had a problem with a previous MRI? No    Does the patient require pre-medication? Do you have a personal history of cancer? None    If "other", please specify:       Are you wearing medication patches?   No    Are you wearing any of the following: None    Date of last menstrual cycle: 7 years    Postmenopausal? No    Pregnant? No    Breastfeeding? No    Breast tissue expander? No    Do any of the following apply to you: None    Please specify:     Did the patient provide this information? Yes    Who provided this information (if not the patient)? Relationship to the patient:     Contact number:     MRI STAFF ONLY- Prior imaging reviewed in PACS (initials):     MRI STAFF ONLY- Epic chart reviewed (initials): luzmaria    MRI STAFF ONLY: The patient was scheduled to receive MRI contrast and has received the Medication Guide. No contrast given        External Results Report    Open External Results Report      Encounter    View Encounter                     Patient Care Timeline    No data selected in time range    Pre-op Summary    Pre-op             Recovery Summary    Recovery                 Routing History    None           No orders to display         No orders of the defined types were placed in this encounter.

## 2023-09-06 ENCOUNTER — TELEPHONE (OUTPATIENT)
Dept: PAIN MEDICINE | Facility: CLINIC | Age: 53
End: 2023-09-06

## 2023-09-06 NOTE — TELEPHONE ENCOUNTER
Scheduled patient for RUDY 10/11/23  Patient denies RX blood thinners/ NSAIDS  Nothing to eat or drink 1 hour prior to procedure  Needs to arrange transportation  Proper clothing for procedure  No vaccines 2 weeks prior or after procedure  If ill or place on antibiotics, please call to reschedule

## 2023-10-11 ENCOUNTER — HOSPITAL ENCOUNTER (OUTPATIENT)
Dept: RADIOLOGY | Facility: HOSPITAL | Age: 53
Setting detail: OUTPATIENT SURGERY
Discharge: HOME | End: 2023-10-11
Payer: COMMERCIAL

## 2023-10-11 ENCOUNTER — HOSPITAL ENCOUNTER (OUTPATIENT)
Facility: AMBULARY SURGERY CENTER | Age: 53
Setting detail: OUTPATIENT SURGERY
Discharge: HOME | End: 2023-10-11
Attending: PHYSICAL MEDICINE & REHABILITATION | Admitting: PHYSICAL MEDICINE & REHABILITATION
Payer: COMMERCIAL

## 2023-10-11 VITALS
OXYGEN SATURATION: 99 % | DIASTOLIC BLOOD PRESSURE: 91 MMHG | HEART RATE: 77 BPM | SYSTOLIC BLOOD PRESSURE: 142 MMHG | TEMPERATURE: 97.6 F | RESPIRATION RATE: 16 BRPM

## 2023-10-11 DIAGNOSIS — Z92.241 S/P EPIDURAL STEROID INJECTION: ICD-10-CM

## 2023-10-11 PROBLEM — M54.12 CERVICAL RADICULOPATHY: Status: ACTIVE | Noted: 2023-10-11

## 2023-10-11 PROCEDURE — 62321 NJX INTERLAMINAR CRV/THRC: CPT | Performed by: PHYSICAL MEDICINE & REHABILITATION

## 2023-10-11 RX ORDER — LIDOCAINE HYDROCHLORIDE 10 MG/ML
INJECTION, SOLUTION EPIDURAL; INFILTRATION; INTRACAUDAL; PERINEURAL AS NEEDED
Status: DISCONTINUED | OUTPATIENT
Start: 2023-10-11 | End: 2023-10-11 | Stop reason: HOSPADM

## 2023-10-11 RX ORDER — METHYLPREDNISOLONE ACETATE 80 MG/ML
INJECTION, SUSPENSION INTRA-ARTICULAR; INTRALESIONAL; INTRAMUSCULAR; SOFT TISSUE AS NEEDED
Status: DISCONTINUED | OUTPATIENT
Start: 2023-10-11 | End: 2023-10-11 | Stop reason: HOSPADM

## 2023-10-11 NOTE — DISCHARGE INSTRUCTIONS
Epidural Steroid Injection   WHAT YOU NEED TO KNOW:   An epidural steroid injection (HERNANDO) is a procedure to inject steroid medicine into the epidural space. The epidural space is between your spinal cord and vertebrae. Steroids reduce inflammation and fluid buildup in your spine that may be causing pain. You may be given pain medicine along with the steroids. ACTIVITY  Do not drive or operate machinery today. No strenuous activity today - bending, lifting, etc.  You may resume normal activites starting tomorrow - start slowly and as tolerated. You may shower today, but no tub baths or hot tubs. You may have numbness for several hours from the local anesthetic. Please use caution and common sense, especially with weight-bearing activities. CARE OF THE INJECTION SITE  If you have soreness or pain, apply ice to the area today (20 minutes on/20 minutes off). Starting tomorrow, you may use warm, moist heat or ice if needed. You may have an increase or change in your discomfort for 36-48 hours after your treatment. Apply ice and continue with any pain medication you have been prescribed. Notify the [facility] if you have any of the following: redness, drainage, swelling, headache, stiff neck or fever above 100°F.    SPECIAL INSTRUCTIONS  Our office will contact you in approximately 7 days for a progress report. MEDICATIONS  Continue to take all routine medications. Our office may have instructed you to hold some medications. As no general anesthesia was used in today's procedure, you should not experience any side effects related to anesthesia. If you are diabetic, the steroids used in today's injection may temporarily increase your blood sugar levels after the first few days after your injection. Please keep a close eye on your sugars and alert the doctor who manages your diabetes if your sugars are significantly high from your baseline or you are symptomatic.      If you have a problem specifically related to your procedure, please call our office at [phone]. Problems not related to your procedure should be directed to your primary care physician.

## 2023-10-11 NOTE — H&P
History of Present Illness: The patient is a 47 y.o. female who presents with complaints of neck pain    Past Medical History:   Diagnosis Date    Chronic pain     head and neck     Tension headache        Past Surgical History:   Procedure Laterality Date    PILONIDAL CYST DRAINAGE      [place] I&D SOFT TISSUE ABSCESS SUBFASCIAL N/A 1/11/2017    Procedure: INCISION AND DRAINAGE (I&D) BUTTOCK;  Surgeon: Kirsten Ramirez MD;  Location: [facility];  Service: General    SHOULDER ARTHROSCOPY W/ ROTATOR CUFF REPAIR Left        No current facility-administered medications for this encounter. No Known Allergies    Physical Exam: There were no vitals filed for this visit.   General: Awake, Alert, Oriented x 3, Mood and affect appropriate  Respiratory: Respirations even and unlabored  Cardiovascular: Peripheral pulses intact; no edema  Musculoskeletal Exam: Tenderness to palpation bilateral cervical paraspinals    ASA Score: 2         Assessment: Cervical radiculopathy    Plan: RUDY

## 2023-10-11 NOTE — OP NOTE
OPERATIVE REPORT  PATIENT NAME: Jose R Richter    :  1970  MRN: [id number]  Pt Location: [facility]/PAIN ROOM 01    SURGERY DATE: 10/11/2023    Surgeon(s) and Role:     * DO Mariela Lees Primary    Preop Diagnosis:  Cervical radiculopathy [M54.12]    Post-Op Diagnosis Codes:     * Cervical radiculopathy [M54.12]    Procedure(s):  C7-T1  CERVICAL epidural steroid injection (91932)    Indication:  Neck and radiating arm pain  Preoperative diagnosis:  Cervical radiculitis  Postoperative diagnosis:  Cervical radiculitis     Procedure: Fluoroscopically-guided  C7-T1 interlaminar epidural steroid injection under fluoroscopy     EBL:  none  Specimens:  not applicable     After discussing the risks, benefits, and alternatives to the procedure, the patient expressed understanding and wished to proceed. The patient was brought to the fluoroscopy suite and placed in the prone position. A procedural pause was conducted to verify:  correct patient identity, procedure to be performed and as applicable, correct side and site, correct patient position, and availability of implants, special equipment and special requirements. After identifying the C7-T1 space fluoroscopically, the skin was sterilely prepped and draped in the usual fashion using Chloraprep skin prep. The skin and subcutaneous tissues were anesthetized with 1% lidocaine. Utilizing a loss of resistance technique and intermittent fluoroscopic guidance, a 3.5 inch 20-gauge Tuohy needle was advanced into the epidural space. Proper needle positioning was confirmed using multiple fluoroscopic views. After negative aspiration, Omnipaque 240 contrast was injected confirming epidural spread without evidence of intravascular or intrathecal spread. A 3 ml solution consisting of 80 mg Depo-Medrol in sterile saline was injected slowly and incrementally into the epidural space.   Following the injection the needle was withdrawn slightly and flushed with 1% buffered lidocaine as it was fully extracted. The patient tolerated the procedure well and there were no apparent complications. After appropriate observation, the patient was dismissed from the clinic in good condition under their own power.            SIGNATURE: Gildardo Wang DO  DATE: October 11, 2023  TIME: 11:32 AM

## 2023-10-18 ENCOUNTER — TELEPHONE (OUTPATIENT)
Dept: PAIN MEDICINE | Facility: CLINIC | Age: 53
End: 2023-10-18

## 2023-10-18 DIAGNOSIS — G43.009 ATYPICAL MIGRAINE: ICD-10-CM

## 2023-10-18 RX ORDER — ERENUMAB-AOOE 140 MG/ML
INJECTION, SOLUTION SUBCUTANEOUS
Qty: 1 ML | Refills: 0 | Status: SHIPPED | OUTPATIENT
Start: 2023-10-18

## 2023-10-18 NOTE — TELEPHONE ENCOUNTER
Pt reports 5% improvement post inj   Pain level 5/10  Pt aware I will call next week for an update

## 2023-10-24 ENCOUNTER — TELEPHONE (OUTPATIENT)
Age: 53
End: 2023-10-24

## 2023-10-24 ENCOUNTER — PREP FOR PROCEDURE (OUTPATIENT)
Age: 53
End: 2023-10-24

## 2023-10-24 DIAGNOSIS — Z12.11 SCREENING FOR COLON CANCER: Primary | ICD-10-CM

## 2023-10-24 NOTE — TELEPHONE ENCOUNTER
Scheduled date of colonoscopy (as of today): 12/4/23    Physician performing colonoscopy: Eileen Alves    Location of colonoscopy: [facility]    Bowel prep reviewed with patient: ROMÁN/JUVE    Instructions reviewed with patient by:    [facility]

## 2023-10-24 NOTE — TELEPHONE ENCOUNTER
10/24/23  Screened by: Yifan Pedraza    Referring Provider [facility]    Pre- Screening: There is no height or weight on file to calculate BMI. Has patient been referred for a routine screening Colonoscopy? yes  Is the patient between 43-73 years old? yes      Previous Colonoscopy no   If yes:    Date:     Facility:     Reason:       SCHEDULING STAFF: If the patient is between 45yrs-49yrs, please advise patient to confirm benefits/coverage with their insurance company for a routine screening colonoscopy, some insurance carriers will only cover at [address] [place] [address] or older. If the patient is over 66years old, please schedule an office visit. Does the patient want to see a Gastroenterologist prior to their procedure OR are they having any GI symptoms? no    Has the patient been hospitalized or had abdominal surgery in the past 6 months? no    Does the patient use supplemental oxygen? no    Does the patient take Coumadin, Lovenox, Plavix, Elliquis, Xarelto, or other blood thinning medication? no    Has the patient had a stroke, cardiac event, or stent placed in the past year? no    SCHEDULING STAFF: If patient answers NO to above questions, then schedule procedure. If patient answers YES to above questions, then schedule office appointment. PASSED OA    If patient is between 45yrs - 49yrs, please advise patient that we will have to confirm benefits & coverage with their insurance company for a routine screening colonoscopy.

## 2023-11-14 ENCOUNTER — TELEPHONE (OUTPATIENT)
Dept: NEUROLOGY | Facility: CLINIC | Age: 53
End: 2023-11-14

## 2023-11-17 ENCOUNTER — OFFICE VISIT (OUTPATIENT)
Dept: PAIN MEDICINE | Facility: CLINIC | Age: 53
End: 2023-11-17

## 2023-11-17 VITALS
HEART RATE: 86 BPM | DIASTOLIC BLOOD PRESSURE: 80 MMHG | WEIGHT: 216 LBS | BODY MASS INDEX: 40.81 KG/M2 | SYSTOLIC BLOOD PRESSURE: 137 MMHG

## 2023-11-17 DIAGNOSIS — M25.50 POLYARTHRALGIA: Primary | ICD-10-CM

## 2023-11-17 NOTE — PROGRESS NOTES
Pain Medicine Follow-Up Note    Assessment:  1. Polyarthralgia        Plan:  Orders Placed This Encounter   Procedures    Ambulatory Referral to Rheumatology     Standing Status:   Future     Standing Expiration Date:   11/17/2024     Referral Priority:   Routine     Referral Type:   Consult - AMB     Referral Reason:   Specialty Services Required     Referred to Provider:   Arcadio Rowe MD     Requested Specialty:   Rheumatology     Number of Visits Requested:   1     Expiration Date:   11/17/2024       Ms. Tio Moreno is a pleasant 53-year-old female who presents to [address] [place] [address] for follow-up and reevaluation regarding ongoing neck pain. During today's evaluation majority of her pain appears to be axial in nature and does have both clinical and diagnostic evidence of facet mediated neck pain with multilevel degenerative disc space narrowing most notable at C4-C5 and C5-C6. At this time  The patient has been experiencing moderate to severe axial spine pain that is causing functional deficit. The pain has been present for at least 3 months and is not improving with conservative care. Currently the patient is not experiencing any radicular features nor neurogenic claudication. Nonfacet pathology has been ruled out on clinical evaluation. We will schedule the patient for bilateral C3, C4, C5 medial branch nerve blocks with intention of moving forward towards radiofrequency ablation if there is an appropriate diagnostic response. The initial blocks will be performed with 0.25% bupivacaine and if an appropriate response is obtained upon review of the patient's pain diary, a confirmatory block will be scheduled with 0.75% bupivacaine. In the office today, we reviewed the nature of facet joint pathology in depth using a spine model. We discussed the approach we would use for the injections and provided literature for home review.  The patient understands the risks associated with the procedure including bleeding, infection, tissue injury, and allergic reaction and provided verbal informed consent in the office today. History of Present Illness:    Alla Hodgkin is a 47 y.o. female who presents to [address] [facility] for interval re-evaluation of the above stated pain complaints. The patient has a past medical and chronic pain history as outlined in the assessment section. Presents to [facility] for follow-up and reevaluation regarding ongoing neck pain. We previously performed cervical epidural steroid injection in October 11, 2023 which did provide significant relief in her pain temporarily, however, she reports recently worsening axial neck pain that has greatly impacted her quality of life and activities of daily living. Currently rates her pain 5-7 out of 10 and interfere with activities. Pain described as a constant throbbing, shooting, aching sensation. Presents today for follow-up and reevaluation. Other than as stated above, the patient denies any interval changes in medications, medical condition, mental condition, symptoms, or allergies since the last office visit. Review of Systems:    Review of Systems   Constitutional:  Negative for chills, fatigue and unexpected weight change. HENT:  Negative for ear pain, mouth sores and sinus pressure. Eyes:  Negative for pain, redness and visual disturbance. Respiratory:  Negative for shortness of breath and wheezing. Cardiovascular:  Negative for chest pain and palpitations. Gastrointestinal:  Negative for abdominal pain and nausea. Endocrine: Negative for polyphagia. Musculoskeletal:  Positive for neck pain and neck stiffness. Negative for arthralgias and back pain. Skin:  Negative for wound. Neurological:  Positive for dizziness, weakness and headaches. Negative for seizures and numbness. Psychiatric/Behavioral:  Positive for sleep disturbance.  Negative for decreased concentration and dysphoric mood. Past Medical History:   Diagnosis Date    Chronic pain     head and neck     Tension headache        Past Surgical History:   Procedure Laterality Date    EPIDURAL BLOCK INJECTION N/A 10/11/2023    Procedure: C7-T1  CERVICAL epidural steroid injection (44484);   Surgeon: Jhon Casas DO;  Location: [facility] MAIN OR;  Service: Pain Management     PILONIDAL CYST DRAINAGE      UT I&D SOFT TISSUE ABSCESS SUBFASCIAL N/A 1/11/2017    Procedure: INCISION AND DRAINAGE (I&D) BUTTOCK;  Surgeon: Marlin Doty MD;  Location: [facility];  Service: General    SHOULDER ARTHROSCOPY W/ ROTATOR CUFF REPAIR Left        Family History   Problem Relation Age of Onset    Dementia Mother     No Known Problems Father        Social History     Occupational History    Not on file   Tobacco Use    Smoking status: Former     Packs/day: 0.50     Years: 20.00     Total pack years: 10.00     Types: Cigarettes    Smokeless tobacco: Never   Vaping Use    Vaping Use: Every day    Substances: Flavoring   Substance and Sexual Activity    Alcohol use: No    Drug use: No    Sexual activity: Not on file         Current Outpatient Medications:     Aimovig 140 MG/ML SOAJ, INJECT 1ML SUBCUTANEOUSLY EVERY MONTH IN THE ABDOMEN THIGH, Disp: 1 mL, Rfl: 0    carbidopa-levodopa (SINEMET)  mg per tablet, Take 1 tablet by mouth daily, Disp: , Rfl:     DULoxetine HCl 40 MG CPEP, , Disp: , Rfl:     Naproxen Sodium (ALEVE) 220 MG CAPS, Take by mouth if needed, Disp: , Rfl:     nortriptyline (PAMELOR) 50 mg capsule, Take 50 mg by mouth daily at bedtime, Disp: , Rfl:     topiramate (TOPAMAX) 200 MG tablet, Take 200 mg by mouth daily, Disp: , Rfl:     cefadroxil (DURICEF) 500 mg capsule, Take 500 mg by mouth 2 (two) times a day, Disp: , Rfl:     meloxicam (MOBIC) 15 mg tablet, , Disp: , Rfl:     terbinafine (LamISIL) 250 mg tablet, Take 250 mg by mouth daily, Disp: , Rfl:     No Known Allergies    Physical Exam:    /80   Pulse 86   Wt 98 kg (216 lb)   BMI 40.81 kg/m²     Constitutional:normal, well developed, well nourished, alert, in no distress and non-toxic and no overt pain behavior.   Eyes:anicteric  HEENT:grossly intact  Neck:supple, symmetric, trachea midline and no masses   Pulmonary:even and unlabored  Cardiovascular:No edema or pitting edema present  Skin:Normal without rashes or lesions and well hydrated  Psychiatric:Mood and affect appropriate  Neurologic:Cranial Nerves II-XII grossly intact  Musculoskeletal:normal gait, tenderness to palpation bilateral cervical paraspinals, decreased active and passive range of motion with cervical flexion, extension, bilateral sidebending and rotation limited by pain, MMT 5-5 bilateral upper extremities, sensation grossly tact to light touch, positive cervical compression testing with axial loading and palpation of bilateral cervical facets      Imaging  No orders to display         Orders Placed This Encounter   Procedures    Ambulatory Referral to Rheumatology

## 2023-11-20 ENCOUNTER — ANESTHESIA EVENT (OUTPATIENT)
Dept: ANESTHESIOLOGY | Facility: HOSPITAL | Age: 53
End: 2023-11-20

## 2023-11-20 ENCOUNTER — ANESTHESIA (OUTPATIENT)
Dept: ANESTHESIOLOGY | Facility: HOSPITAL | Age: 53
End: 2023-11-20

## 2023-11-21 ENCOUNTER — TELEPHONE (OUTPATIENT)
Dept: PAIN MEDICINE | Facility: CLINIC | Age: 53
End: 2023-11-21

## 2023-11-21 NOTE — TELEPHONE ENCOUNTER
Scheduled patient for MBB #1 12/20/2023  Patient denies RX blood thinners/ NSAIDS  Nothing to eat or drink 1 hour prior to procedure  Needs to arrange transportation  Proper clothing for procedure  No vaccines 2 weeks prior or after procedure  If ill or place on antibiotics, please call to reschedule

## 2023-11-29 ENCOUNTER — OFFICE VISIT (OUTPATIENT)
Dept: NEUROLOGY | Facility: CLINIC | Age: 53
End: 2023-11-29
Payer: COMMERCIAL

## 2023-11-29 VITALS
HEIGHT: 61 IN | DIASTOLIC BLOOD PRESSURE: 76 MMHG | WEIGHT: 211 LBS | SYSTOLIC BLOOD PRESSURE: 110 MMHG | TEMPERATURE: 97.2 F | BODY MASS INDEX: 39.84 KG/M2

## 2023-11-29 DIAGNOSIS — G89.29 CHRONIC HEADACHES: Primary | ICD-10-CM

## 2023-11-29 DIAGNOSIS — M50.30 DDD (DEGENERATIVE DISC DISEASE), CERVICAL: ICD-10-CM

## 2023-11-29 DIAGNOSIS — R51.9 CHRONIC HEADACHES: Primary | ICD-10-CM

## 2023-11-29 DIAGNOSIS — G44.86 CERVICOGENIC HEADACHE: ICD-10-CM

## 2023-11-29 PROCEDURE — 99214 OFFICE O/P EST MOD 30 MIN: CPT | Performed by: PSYCHIATRY & NEUROLOGY

## 2023-11-29 NOTE — PROGRESS NOTES
Return NeuroOutpatient Note        Dayami Prasad  [id number]  46 y.o.  1970       Cervicogenic headache; *DDD (degenerative disc disease), cervical; Atypical migraine; and Left hip pain        History obtained from:  Patient     HPI/Subjective:    Dayami Prasad is a 47 yo F with PMH of headaches, cervical DDD presents as f/u. She was initially seen in June 2023. She has described her headache as originating from base of neck to all over head. Pain is pressure type with shooting quality in between. There is no associated nausea, vomiting. There is no associated light or noise sensitivity. She also reports associated postural dizziness. When she bends down, her headache exaggerates. She had reported about 15 days of headaches a month. She has been on Aimovig for past 3 months. She's also on pamelor 50mg daily, cymbalta 60mg daily and topamax 200mg daily. Her MRI cervical spine has revealed C3-C4: Small disc bulge without significant central or foraminal stenosis. She had 1 epidural injection which helped by 20%. She's scheduled for 2 nerve blocks in near future. She has done PT for 6 months without much benefit. Says she has tried muscle relaxants in past and doesn't remember them to be effective. She's had epidural injections in past but says they were ineffective. She's had MRI brain and MRA and both were normal.      She's on sinemet for RLS. Her mother had migraines. Her maternal aunt had migraines. She also reports left hip referred pain in left groin.           Past Medical History:   Diagnosis Date   • Chronic pain     head and neck    • Tension headache      Social History     Socioeconomic History   • Marital status: /Civil Union     Spouse name: Not on file   • Number of children: Not on file   • Years of education: Not on file   • Highest education level: Not on file   Occupational History   • Not on file   Tobacco Use   • Smoking status: Former     Packs/day: 0.50 Years: 20.00     Total pack years: 10.00     Types: Cigarettes   • Smokeless tobacco: Never   Vaping Use   • Vaping Use: Every day   • Substances: Flavoring   Substance and Sexual Activity   • Alcohol use: No   • Drug use: No   • Sexual activity: Not on file   Other Topics Concern   • Not on file   Social History Narrative   • Not on file     Social Determinants of Health     Financial Resource Strain: Not on file   Food Insecurity: Not on file   Transportation Needs: Not on file   Physical Activity: Not on file   Stress: Not on file   Social Connections: Not on file   Intimate Partner Violence: Not on file   Housing Stability: Not on file     Family History   Problem Relation Age of Onset   • Dementia Mother    • No Known Problems Father      No Known Allergies  Current Outpatient Medications on File Prior to Visit   Medication Sig Dispense Refill   • Aimovig 140 MG/ML SOAJ INJECT 1ML SUBCUTANEOUSLY EVERY MONTH IN THE ABDOMEN THIGH 1 mL 0   • carbidopa-levodopa (SINEMET)  mg per tablet Take 1 tablet by mouth daily     • DULoxetine HCl 40 MG CPEP 60 mg     • Naproxen Sodium (ALEVE) 220 MG CAPS Take by mouth if needed     • nortriptyline (PAMELOR) 50 mg capsule Take 50 mg by mouth daily at bedtime     • topiramate (TOPAMAX) 200 MG tablet Take 200 mg by mouth daily     • cefadroxil (DURICEF) 500 mg capsule Take 500 mg by mouth 2 (two) times a day     • meloxicam (MOBIC) 15 mg tablet      • terbinafine (LamISIL) 250 mg tablet Take 250 mg by mouth daily       No current facility-administered medications on file prior to visit. Review of Systems   Refer to positive review of systems in HPI.    Review of Systems    Constitutional- No fever  Eyes- No visual change  ENT- Hearing normal  CV- No chest pain  Resp- No Shortness of breath  GI- No diarrhea  - Bladder normal  MS- No Arthritis   Skin- No rash  Psych- No depression  Endo- No DM  Heme- No nodes    Vitals:    11/29/23 1623   BP: 110/76   BP Location: Left arm   Patient Position: Sitting   Cuff Size: Standard   Temp: (!) 97.2 °F (36.2 °C)   TempSrc: Tympanic   Weight: 95.7 kg (211 lb)   Height: 5' 1" (1.549 m)       PHYSICAL EXAM:  Appearance: No Acute Distress  Ophthalmoscopic: Disc Flat, Normal fundus  Mental status:  Orientation: Awake, Alert, and Orientedx3  Memory: Registation 3/3 Recall 3/3  Attention: normal  Knowledge: good  Language: No aphasia  Speech: No dysarthria  Cranial Nerves:  2 No Visual Defect on Confrontation, Pupils round, equal, reactive to light  3,4,6 Extraocular Movements Intact, no nystagmus  5 Facial Sensation Intact  7 No facial asymmetry  8 Intact hearing  9,10 Palate symmetric, normal gag  11 Good shoulder shrug  12 Tongue Midline  Gait: Stable  Coordination: No ataxia with finger to nose testing, and heel to shin  Sensory: Intact, Symmetric to pinprick, light touch, vibration, and joint position  Muscle Tone: Normal              Muscle exam:  Arm Right Left Leg Right Left   Deltoid 5/5 5/5 Iliopsoas 5/5 5/5   Biceps 5/5 5/5 Quads 5/5 5/5   Triceps 5/5 5/5 Hamstrings 5/5 5/5   Wrist Extension 5/5 5/5 Ankle Dorsi Flexion 5/5 5/5   Wrist Flexion 5/5 5/5 Ankle Plantar Flexion 5/5 5/5   Interossei 5/5 5/5 Ankle Eversion 5/5 5/5   APB 5/5 5/5 Ankle Inversion 5/5 5/5       Reflexes   RJ BJ TJ KJ AJ Plantars Sauceda's   Right 2+ 2+ 2+ 2+ 2+ Downgoing Not present   Left 2+ 2+ 2+ 2+ 2+ Downgoing Not present     Personal review of  Labs:                 Diagnoses and all orders for this visit:      1. Chronic headaches        2. DDD (degenerative disc disease), cervical        3. Cervicogenic headache              Patient is still suffering from about 3 weeks of headaches a month despite being on Aimovig, topamax. Will wait to see if nerve blocks by pain management helps her. If no relief then we need to consider botox. She is asked to notify us by mid Jan to see if treatment with pain management worked.  If not, will start paperwork for botox.              Total time of encounter:  30 min  More than 50% of the time was used in counseling and/or coordination of care  Extent of counseling and/or coordination of care        Suki Sanford MD  [facility]  [address]  [phone]

## 2023-11-30 ENCOUNTER — TELEPHONE (OUTPATIENT)
Dept: NEUROLOGY | Facility: CLINIC | Age: 53
End: 2023-11-30

## 2023-11-30 DIAGNOSIS — G43.009 ATYPICAL MIGRAINE: ICD-10-CM

## 2023-11-30 RX ORDER — ERENUMAB-AOOE 140 MG/ML
INJECTION, SOLUTION SUBCUTANEOUS
Qty: 1 ML | Refills: 0 | Status: SHIPPED | OUTPATIENT
Start: 2023-11-30

## 2023-11-30 NOTE — TELEPHONE ENCOUNTER
received vm from 11/30 at 8:34am- hi, this is Yarelis Sosa,  birthday 1970. I was just there yesterday to see the doctor about my headaches. And I had forgot to ask her if she could write me a note for jury duty so that I didn't have to serve. I don't know that I could sit there for hours with the headaches that I've been having. And she understands my situation. My  is going to be bringing my daughter to [place] this afternoon to see a dermatologist at like 4:30 or 2:30. And maybe he could pick it up if this is a possibility. if that is something she could do for me. So if you could just give me a call back and let me know, I would appreciate it. Thank you. Have a great day bygilberto.  [phone]  ----------------------------------------  Left message for pt that vm was received and will send message to dr. Giovanna Quesada that if it is ok to generate letter, we could send it to her mychart or she can pick it up at the office.      Please advise if ok to generate jury duty excuse

## 2023-12-01 NOTE — TELEPHONE ENCOUNTER
Letter generated and sent to pt via [employer]    Pt made aware of above. She is also asking this be emailed to her at  [email or web address]    Please email letter to pt

## 2023-12-03 ENCOUNTER — ANESTHESIA EVENT (OUTPATIENT)
Dept: ANESTHESIOLOGY | Facility: HOSPITAL | Age: 53
End: 2023-12-03

## 2023-12-03 ENCOUNTER — ANESTHESIA (OUTPATIENT)
Dept: ANESTHESIOLOGY | Facility: HOSPITAL | Age: 53
End: 2023-12-03

## 2023-12-03 RX ORDER — SODIUM CHLORIDE, SODIUM LACTATE, POTASSIUM CHLORIDE, CALCIUM CHLORIDE 600; 310; 30; 20 MG/100ML; MG/100ML; MG/100ML; MG/100ML
75 INJECTION, SOLUTION INTRAVENOUS CONTINUOUS
Status: CANCELLED | OUTPATIENT
Start: 2023-12-03

## 2023-12-04 ENCOUNTER — ANESTHESIA (OUTPATIENT)
Dept: GASTROENTEROLOGY | Facility: AMBULARY SURGERY CENTER | Age: 53
End: 2023-12-04

## 2023-12-04 ENCOUNTER — ANESTHESIA EVENT (OUTPATIENT)
Dept: GASTROENTEROLOGY | Facility: AMBULARY SURGERY CENTER | Age: 53
End: 2023-12-04

## 2023-12-04 ENCOUNTER — HOSPITAL ENCOUNTER (OUTPATIENT)
Dept: GASTROENTEROLOGY | Facility: AMBULARY SURGERY CENTER | Age: 53
Setting detail: OUTPATIENT SURGERY
Discharge: HOME | End: 2023-12-04
Attending: INTERNAL MEDICINE
Payer: COMMERCIAL

## 2023-12-04 VITALS
TEMPERATURE: 97.5 F | DIASTOLIC BLOOD PRESSURE: 68 MMHG | RESPIRATION RATE: 18 BRPM | WEIGHT: 211 LBS | SYSTOLIC BLOOD PRESSURE: 116 MMHG | HEART RATE: 82 BPM | BODY MASS INDEX: 39.84 KG/M2 | OXYGEN SATURATION: 98 % | HEIGHT: 61 IN

## 2023-12-04 DIAGNOSIS — Z12.11 SCREENING FOR COLON CANCER: ICD-10-CM

## 2023-12-04 PROBLEM — G43.909 MIGRAINES: Status: ACTIVE | Noted: 2023-12-04

## 2023-12-04 PROBLEM — G25.81 RESTLESS LEG SYNDROME: Status: ACTIVE | Noted: 2023-12-04

## 2023-12-04 PROCEDURE — 45385 COLONOSCOPY W/LESION REMOVAL: CPT | Performed by: INTERNAL MEDICINE

## 2023-12-04 PROCEDURE — 45380 COLONOSCOPY AND BIOPSY: CPT | Performed by: INTERNAL MEDICINE

## 2023-12-04 PROCEDURE — 88305 TISSUE EXAM BY PATHOLOGIST: CPT | Performed by: PATHOLOGY

## 2023-12-04 RX ORDER — PROPOFOL 10 MG/ML
INJECTION, EMULSION INTRAVENOUS CONTINUOUS PRN
Status: DISCONTINUED | OUTPATIENT
Start: 2023-12-04 | End: 2023-12-04

## 2023-12-04 RX ORDER — PROPOFOL 10 MG/ML
INJECTION, EMULSION INTRAVENOUS AS NEEDED
Status: DISCONTINUED | OUTPATIENT
Start: 2023-12-04 | End: 2023-12-04

## 2023-12-04 RX ORDER — ONDANSETRON 2 MG/ML
INJECTION INTRAMUSCULAR; INTRAVENOUS AS NEEDED
Status: DISCONTINUED | OUTPATIENT
Start: 2023-12-04 | End: 2023-12-04

## 2023-12-04 RX ORDER — SODIUM CHLORIDE, SODIUM LACTATE, POTASSIUM CHLORIDE, CALCIUM CHLORIDE 600; 310; 30; 20 MG/100ML; MG/100ML; MG/100ML; MG/100ML
75 INJECTION, SOLUTION INTRAVENOUS CONTINUOUS
Status: DISCONTINUED | OUTPATIENT
Start: 2023-12-04 | End: 2023-12-08 | Stop reason: HOSPADM

## 2023-12-04 RX ORDER — LIDOCAINE HYDROCHLORIDE 10 MG/ML
INJECTION, SOLUTION EPIDURAL; INFILTRATION; INTRACAUDAL; PERINEURAL AS NEEDED
Status: DISCONTINUED | OUTPATIENT
Start: 2023-12-04 | End: 2023-12-04

## 2023-12-04 RX ADMIN — ONDANSETRON 4 MG: 2 INJECTION INTRAMUSCULAR; INTRAVENOUS at 13:54

## 2023-12-04 RX ADMIN — LIDOCAINE HYDROCHLORIDE 50 MG: 10 INJECTION, SOLUTION EPIDURAL; INFILTRATION; INTRACAUDAL; PERINEURAL at 13:45

## 2023-12-04 RX ADMIN — SODIUM CHLORIDE, SODIUM LACTATE, POTASSIUM CHLORIDE, AND CALCIUM CHLORIDE 75 ML/HR: .6; .31; .03; .02 INJECTION, SOLUTION INTRAVENOUS at 13:21

## 2023-12-04 RX ADMIN — PROPOFOL 140 MCG/KG/MIN: 10 INJECTION, EMULSION INTRAVENOUS at 13:46

## 2023-12-04 RX ADMIN — PROPOFOL 100 MG: 10 INJECTION, EMULSION INTRAVENOUS at 13:45

## 2023-12-04 RX ADMIN — PROPOFOL 50 MG: 10 INJECTION, EMULSION INTRAVENOUS at 13:46

## 2023-12-04 NOTE — ANESTHESIA POSTPROCEDURE EVALUATION
Post-Op Assessment Note    CV Status:  Stable  Pain Score: 0    Pain management: adequate       Mental Status:  Sleepy   Hydration Status:  Stable   PONV Controlled:  None   Airway Patency:  Patent     Post Op Vitals Reviewed: Yes    No anethesia notable event occurred.     Staff: Anesthesiologist, CRNA               BP   107/60   Temp      Pulse  73   Resp   14   SpO2   98

## 2023-12-04 NOTE — H&P
History and Physical - [facility]  Nicholas Yates 46 y.o. female MRN: [id number]                  HPI: Nicholas Yates is a 46y.o. year old female who presents for open access screening colonoscopy. REVIEW OF SYSTEMS: Per the HPI, and otherwise unremarkable. Historical Information   Past Medical History:   Diagnosis Date    Chronic pain     head and neck     Tension headache      Past Surgical History:   Procedure Laterality Date    EPIDURAL BLOCK INJECTION N/A 10/11/2023    Procedure: C7-T1  CERVICAL epidural steroid injection (19187);   Surgeon: Ruben Mays DO;  Location: [facility] MAIN OR;  Service: Pain Management     PILONIDAL CYST DRAINAGE      MT I&D SOFT TISSUE ABSCESS SUBFASCIAL N/A 1/11/2017    Procedure: INCISION AND DRAINAGE (I&D) BUTTOCK;  Surgeon: Charlee Salinas MD;  Location: [facility]  Service: General    SHOULDER ARTHROSCOPY W/ ROTATOR CUFF REPAIR Left      Social History   Social History     Substance and Sexual Activity   Alcohol Use No     Social History     Substance and Sexual Activity   Drug Use No     Social History     Tobacco Use   Smoking Status Former    Packs/day: 0.50    Years: 20.00    Total pack years: 10.00    Types: Cigarettes   Smokeless Tobacco Never     Family History   Problem Relation Age of Onset    Dementia Mother     No Known Problems Father        Meds/Allergies       Current Outpatient Medications:     Aimovig 140 MG/ML SOAJ    carbidopa-levodopa (SINEMET)  mg per tablet    cefadroxil (DURICEF) 500 mg capsule    DULoxetine HCl 40 MG CPEP    nortriptyline (PAMELOR) 50 mg capsule    topiramate (TOPAMAX) 200 MG tablet    meloxicam (MOBIC) 15 mg tablet    Naproxen Sodium (ALEVE) 220 MG CAPS    terbinafine (LamISIL) 250 mg tablet    Current Facility-Administered Medications:     lactated ringers infusion, 75 mL/hr, Intravenous, Continuous, 75 mL/hr at 12/04/23 1321    No Known Allergies    Objective     /74   Pulse 77   Temp 97.5 °F (36.4 °C) (Temporal)   Resp 18   Ht 5' 1" (1.549 m)   Wt 95.7 kg (211 lb)   SpO2 99%   BMI 39.87 kg/m²       PHYSICAL EXAM    Gen: NAD  Head: NCAT  CV: RRR  CHEST: Clear  ABD: soft, NT/ND  EXT: no edema      ASSESSMENT/PLAN:  This is a 46y.o. year old female here for colonoscopy, and she is stable and optimized for her procedure.

## 2023-12-04 NOTE — ANESTHESIA PREPROCEDURE EVALUATION
Procedure:  COLONOSCOPY    Relevant Problems   CARDIO   (+) Migraines      NEURO/PSYCH   (+) Migraines      Other   (+) Restless leg syndrome        Physical Exam    Airway    Mallampati score: II  TM Distance: >3 FB  Neck ROM: full     Dental       Cardiovascular  Rhythm: regular, Rate: normal    Pulmonary   Breath sounds clear to auscultation    Other Findings  post-pubertal.      Anesthesia Plan  ASA Score- 2     Anesthesia Type- IV sedation with anesthesia with ASA Monitors. Additional Monitors:     Airway Plan:            Plan Factors-    Chart reviewed. Patient is not a current smoker. Induction- intravenous. Postoperative Plan-     Informed Consent- Anesthetic plan and risks discussed with patient. I personally reviewed this patient with the CRNA. Discussed and agreed on the Anesthesia Plan with the CRNA. Ya Green

## 2023-12-07 PROCEDURE — 88305 TISSUE EXAM BY PATHOLOGIST: CPT | Performed by: PATHOLOGY

## 2023-12-20 ENCOUNTER — APPOINTMENT (OUTPATIENT)
Dept: RADIOLOGY | Facility: HOSPITAL | Age: 53
End: 2023-12-20
Payer: COMMERCIAL

## 2023-12-20 ENCOUNTER — HOSPITAL ENCOUNTER (OUTPATIENT)
Facility: AMBULARY SURGERY CENTER | Age: 53
Setting detail: OUTPATIENT SURGERY
Discharge: HOME | End: 2023-12-20
Attending: PHYSICAL MEDICINE & REHABILITATION | Admitting: PHYSICAL MEDICINE & REHABILITATION
Payer: COMMERCIAL

## 2023-12-20 VITALS
SYSTOLIC BLOOD PRESSURE: 128 MMHG | TEMPERATURE: 97.5 F | RESPIRATION RATE: 17 BRPM | DIASTOLIC BLOOD PRESSURE: 86 MMHG | OXYGEN SATURATION: 99 % | HEART RATE: 79 BPM

## 2023-12-20 PROBLEM — M47.812 CERVICAL SPONDYLOSIS: Status: ACTIVE | Noted: 2023-12-20

## 2023-12-20 PROCEDURE — 64491 INJ PARAVERT F JNT C/T 2 LEV: CPT | Performed by: PHYSICAL MEDICINE & REHABILITATION

## 2023-12-20 PROCEDURE — NC001 PR NO CHARGE: Performed by: PHYSICAL MEDICINE & REHABILITATION

## 2023-12-20 PROCEDURE — 64490 INJ PARAVERT F JNT C/T 1 LEV: CPT | Performed by: PHYSICAL MEDICINE & REHABILITATION

## 2023-12-20 RX ORDER — BUPIVACAINE HYDROCHLORIDE 2.5 MG/ML
INJECTION, SOLUTION EPIDURAL; INFILTRATION; INTRACAUDAL AS NEEDED
Status: DISCONTINUED | OUTPATIENT
Start: 2023-12-20 | End: 2023-12-20 | Stop reason: HOSPADM

## 2023-12-20 NOTE — OP NOTE
OPERATIVE REPORT  PATIENT NAME: Meredith Cintron    :  1970  MRN: [id number]  Pt Location: [facility] MINOR/PAIN ROOM 01    SURGERY DATE: 2023    Surgeons and Role:     * Romero Garcia DO - Primary    Preop Diagnosis:  Polyarthralgia [M25.50]    Post-Op Diagnosis Codes:     * Polyarthralgia [M25.50]    Procedure(s):  Bilateral - B/L C3 C4 C5 MEDIAL BRANCH BLOCK #1    Indication:  Mechanical neck pain  Preoperative diagnosis:  1. Cervical spondylosis                                                                            2. Cervicalgia  Postoperative diagnosis: 1. Cervical spondylosis                                                                            2. Cervicalgia     Procedure: Fluoroscopically-guided Medial Branch Nerve Blocks of the bilateral C3, C4, C5 facet joint(s) using 0.25% bupivacaine     EBL:  none  Specimens:  not applicable     After discussing the risks, benefits, and alternatives to the procedure, the patient expressed understanding and wished to proceed.  The patient was brought to the fluoroscopy suite and placed in the prone position.  Procedural pause conducted to verify:  correct patient identity, procedure to be performed and as applicable, correct side and site, correct patient position, and availability of implants, special equipment and special requirements.  Using fluoroscopy, the mid-body of the articular pillars of the appropriate levels were identified.  The skin was sterilely prepped and draped in the usual fashion using Chloraprep skin prep.  Using fluoroscopic guidance, a 3.5 inch 25-gauge spinal needle was advanced to each target.  After negative aspiration, 1cc of 0.25% bupivacaine was injected at each site and the needles were then removed.  The patient tolerated the procedure well and there were no apparent complications.  After appropriate observation, the patient was dismissed from the clinic in good condition under their own power.  The patient was  instructed to keep a pain diary and report the results to our office.                                                        SIGNATURE: Romero Garcia, DO  DATE: December 20, 2023  TIME: 9:08 AM

## 2023-12-20 NOTE — DISCHARGE INSTRUCTIONS

## 2023-12-20 NOTE — H&P
History of Present Illness: The patient is a 53 y.o. female who presents with complaints of neck pain    Past Medical History:   Diagnosis Date    Chronic pain     head and neck     Tension headache        Past Surgical History:   Procedure Laterality Date    EPIDURAL BLOCK INJECTION N/A 10/11/2023    Procedure: C7-T1  CERVICAL epidural steroid injection ([id number]);  Surgeon: Romero Garcia DO;  Location: [facility] MAIN OR;  Service: Pain Management     PILONIDAL CYST DRAINAGE      SC I&D SOFT TISSUE ABSCESS SUBFASCIAL N/A 1/11/2017    Procedure: INCISION AND DRAINAGE (I&D) BUTTOCK;  Surgeon: Antolin Peralta MD;  Location: [facility] MAIN OR;  Service: General    SHOULDER ARTHROSCOPY W/ ROTATOR CUFF REPAIR Left        No current facility-administered medications for this encounter.    No Known Allergies    Physical Exam:   Vitals:    12/20/23 0805   BP: 125/70   Pulse: 89   Resp: 16   Temp: 97.5 °F (36.4 °C)   SpO2: 96%     General: Awake, Alert, Oriented x 3, Mood and affect appropriate  Respiratory: Respirations even and unlabored  Cardiovascular: Peripheral pulses intact; no edema  Musculoskeletal Exam: Tenderness palpation bilateral cervical paraspinals  ASA Score: 2    Patient/Chart Verification  Patient ID Verified: Verbal, Armband  ID Band Applied: Yes  Consents Confirmed: Procedural  H&P( within 30 days) Verified: Yes  Interval H&P(within 24 hr) Complete (required for Outpatients and Surgery Admit only): Yes  Beta Blocker given : N/A  Pre-op Lab/Test Results Available: N/A  Pregnancy Lab Collected: N/A comment  Does Patient Have a Prosthetic Device/Implant: No    Assessment: Cervical spondylosis    Plan: Bilateral C5 3, C4, C5 MBB #1

## 2023-12-21 ENCOUNTER — TELEPHONE (OUTPATIENT)
Dept: PAIN MEDICINE | Facility: CLINIC | Age: 53
End: 2023-12-21

## 2023-12-21 NOTE — TELEPHONE ENCOUNTER
----- Message from Romero Garcia DO sent at 12/21/2023 10:29 AM EST -----  Regarding: FW: Pain Diary MBB # 1  Pain diary reviewed and unsuccessful block  No significant improvements with the bilateral C3-C5 MBB  May schedule an office follow-up and reevaluation with CRNP  Thank you  ----- Message -----  From: Marika Cortez  Sent: 12/21/2023   9:41 AM EST  To: Romero Garcia DO  Subject: Pain Diary MBB # 1                               Pain diary for MBB # 1 was scanned into the chart under the media tab

## 2023-12-27 DIAGNOSIS — G43.009 ATYPICAL MIGRAINE: ICD-10-CM

## 2023-12-27 RX ORDER — ERENUMAB-AOOE 140 MG/ML
INJECTION, SOLUTION SUBCUTANEOUS
Qty: 1 ML | Refills: 0 | Status: SHIPPED | OUTPATIENT
Start: 2023-12-27

## 2024-01-16 ENCOUNTER — TELEMEDICINE (OUTPATIENT)
Dept: RHEUMATOLOGY | Facility: CLINIC | Age: 54
End: 2024-01-16
Payer: COMMERCIAL

## 2024-01-16 ENCOUNTER — TELEPHONE (OUTPATIENT)
Dept: RHEUMATOLOGY | Facility: CLINIC | Age: 54
End: 2024-01-16

## 2024-01-16 DIAGNOSIS — R10.32 LEFT GROIN PAIN: ICD-10-CM

## 2024-01-16 DIAGNOSIS — M47.812 OSTEOARTHRITIS OF CERVICAL SPINE, UNSPECIFIED SPINAL OSTEOARTHRITIS COMPLICATION STATUS: ICD-10-CM

## 2024-01-16 DIAGNOSIS — R76.8 POSITIVE ANA (ANTINUCLEAR ANTIBODY): Primary | ICD-10-CM

## 2024-01-16 PROCEDURE — 99204 OFFICE O/P NEW MOD 45 MIN: CPT | Performed by: INTERNAL MEDICINE

## 2024-01-16 NOTE — PROGRESS NOTES
Virtual Regular Visit    Verification of patient location:    Patient is located at Home in the following state in which I hold an active license [place]      Assessment/Plan:    Problem List Items Addressed This Visit    None  Visit Diagnoses       Positive ALEC (antinuclear antibody)    -  Primary    Relevant Orders    Anti-DNA antibody, double-stranded    Anti-scleroderma antibody    Centromere Antibody    Nuclear antigen antibody    Sjogren's Antibodies    C3 complement    C4 complement    Beta-2 glycoprotein antibodies    Cardiolipin antibody    Lupus anticoagulant    Urinalysis with microscopic    Protein / creatinine ratio, urine    Cyclic citrul peptide antibody, IgG    Left groin pain        Relevant Orders    Ambulatory Referral to Orthopedic Surgery    Cyclic citrul peptide antibody, IgG    Osteoarthritis of cervical spine, unspecified spinal osteoarthritis complication status                     Reason for visit is new patient.      Chief Complaint   Patient presents with    Virtual Regular Visit          Encounter provider Sharifa Fortune MD    Provider located at [facility] [address], [facility] [address]  [facility] [id number] [phone]      Recent Visits  No visits were found meeting these conditions.  Showing recent visits within past 7 days and meeting all other requirements  Today's Visits  Date Type Provider Dept   01/16/24 Telephone Sharifa Fortune MD [facility]   01/16/24 Telemedicine Sharifa Fortune MD  Rheumatology [facility]   Showing today's visits and meeting all other requirements  Future Appointments  No visits were found meeting these conditions.  Showing future appointments within next 150 days and meeting all other requirements       The patient was identified by name and date of birth. Meredith Cintron was informed that this is a telemedicine visit and that the visit is being conducted through  the Epic Embedded platform. She agrees to proceed..  My office door was closed. No one else was in the room.  She acknowledged consent and understanding of privacy and security of the video platform. The patient has agreed to participate and understands they can discontinue the visit at any time.    Patient is aware this is a billable service.       Subjective    HPI     Ms. Cintron is a 53-year-old female with history significant for cervical degenerative arthritis who presents for an evaluation of a positive ALEC 1:160 speckled pattern and left groin pain.  She is referred by Dr. Garcia for a rheumatology consult.    Patient reports for the past year she has experienced intermittent pain in her left groin region.  She did have an x-ray of the left hip done which was normal.  She also completed physical therapy which did not help.  She generally tries to avoid taking medications but has tried Aleve or Advil on an as-needed basis which does provide her with temporary relief.  She established with Dr. Ward for an evaluation and had blood work done which showed the positive ALEC.  An ESR, CRP and rheumatoid factor were normal.  She was advised that her symptoms may be consistent with lupus versus rheumatoid arthritis but there was lack of clarity with a diagnosis.  She was trialed on meloxicam without any benefit as well as a 5-day course of prednisone which did not help.  Patient is seeking a second opinion today.    She denies fevers, unintentional weight loss, alopecia, dry eyes, dry mouth, inflammatory eye disease, skin rash, psoriasis, photosensitivity, mouth/nose ulcers, swollen glands, pleuritic chest pain, inflammatory bowel disease, blood clots, miscarriages, Raynaud's or additional areas of joint pains/swelling/stiffness.  There is no family history of autoimmune disease.      Past Medical History:   Diagnosis Date    Chronic pain     head and neck     Tension headache        Past Surgical History:    Procedure Laterality Date    EPIDURAL BLOCK INJECTION N/A 10/11/2023    Procedure: C7-T1  CERVICAL epidural steroid injection ([id number]);  Surgeon: Romero Garcia DO;  Location: [facility] MAIN OR;  Service: Pain Management     NERVE BLOCK Bilateral 12/20/2023    Procedure: B/L C3 C4 C5 MEDIAL BRANCH BLOCK #1;  Surgeon: Romero Garcia DO;  Location: [facility] MAIN OR;  Service: Pain Management     PILONIDAL CYST DRAINAGE      UT I&D SOFT TISSUE ABSCESS SUBFASCIAL N/A 1/11/2017    Procedure: INCISION AND DRAINAGE (I&D) BUTTOCK;  Surgeon: Antolin Peralta MD;  Location: [facility] MAIN OR;  Service: General    SHOULDER ARTHROSCOPY W/ ROTATOR CUFF REPAIR Left        Current Outpatient Medications   Medication Sig Dispense Refill    Aimovig 140 MG/ML SOAJ INJECT 1ML SUBCUTANEOUSLY EVERY MONTH IN THE ABDOMEN THIGH 1 mL 0    carbidopa-levodopa (SINEMET)  mg per tablet Take 1 tablet by mouth daily      DULoxetine HCl 40 MG CPEP 60 mg      Naproxen Sodium (ALEVE) 220 MG CAPS Take by mouth if needed      nortriptyline (PAMELOR) 50 mg capsule Take 50 mg by mouth daily at bedtime      topiramate (TOPAMAX) 200 MG tablet Take 200 mg by mouth daily       No current facility-administered medications for this visit.        No Known Allergies      Review of Systems  Constitutional: Negative for weight change, fevers, chills, night sweats, fatigue.  ENT/Mouth: Negative for hearing changes, ear pain, nasal congestion, sinus pain, hoarseness, sore throat, rhinorrhea, swallowing difficulty.   Eyes: Negative for pain, redness, discharge, vision changes.   Cardiovascular: Negative for chest pain, SOB, palpitations.   Respiratory: Negative for cough, sputum, wheezing, dyspnea.   Gastrointestinal: Negative for nausea, vomiting, diarrhea, constipation, pain, heartburn.  Genitourinary: Negative for dysuria, urinary frequency, hematuria.   Musculoskeletal: As per HPI.  Skin: Negative for skin rash, color changes.   Neuro: Negative for weakness,  numbness, tingling, loss of consciousness.   Psych: Negative for anxiety, depression.   Heme/Lymph: Negative for easy bruising, bleeding, lymphadenopathy.        Video Exam    There were no vitals filed for this visit.    Physical Exam   General: Well appearing, well nourished, in no distress. Oriented x 3, normal mood and affect.  Skin: Good turgor, no rash, unusual bruising or prominent lesions.  Hair: Normal texture and distribution.  HEENT:  Head: Normocephalic, atraumatic.  Eyes: Conjunctiva clear, sclera non-icteric, EOM intact.  Nose: No external lesions.  Neck: Supple.  Neurologic: Alert and oriented. No focal neurological deficits appreciated.   Psychiatric: Normal mood and affect.       I personally reviewed the x-ray of the left hip images in PACS which is normal.      Assessment and plan:  Ms. Cintron is a 53-year-old female with history significant for cervical degenerative arthritis who presents for an evaluation of a positive ALEC 1:160 speckled pattern and left groin pain.  She is referred by Dr. Garcia for a rheumatology consult.    - Meredith presents today for an evaluation of a positive ALEC that was detected in view of intermittent left groin pain she has been experiencing over the past year.  She has completed physical therapy and trialed meloxicam and prednisone without any benefit.  Over-the-counter NSAIDs do provide her with temporary relief.  Her review of systems is otherwise unremarkable.  In view of this I have low suspicion that the intermittent left groin pain is related to the positive ALEC and recommend she see orthopedics for an evaluation.    - To further evaluate the positive ALEC I will complete subset serologies but based on her initial presentation my suspicion that this is clinically significant is low.    - She will follow-up with spine and pain management for continued treatment of the neck pain secondary to cervical degenerative arthritis.      Visit Time  Total Visit Duration: 21  minutes

## 2024-01-23 ENCOUNTER — APPOINTMENT (OUTPATIENT)
Dept: LAB | Facility: CLINIC | Age: 54
End: 2024-01-23
Payer: COMMERCIAL

## 2024-01-23 ENCOUNTER — TELEPHONE (OUTPATIENT)
Dept: OBGYN CLINIC | Facility: CLINIC | Age: 54
End: 2024-01-23

## 2024-01-23 ENCOUNTER — OFFICE VISIT (OUTPATIENT)
Dept: PAIN MEDICINE | Facility: CLINIC | Age: 54
End: 2024-01-23
Payer: COMMERCIAL

## 2024-01-23 VITALS
BODY MASS INDEX: 40.81 KG/M2 | SYSTOLIC BLOOD PRESSURE: 132 MMHG | WEIGHT: 216 LBS | HEART RATE: 78 BPM | DIASTOLIC BLOOD PRESSURE: 72 MMHG

## 2024-01-23 DIAGNOSIS — R10.32 LEFT GROIN PAIN: ICD-10-CM

## 2024-01-23 DIAGNOSIS — M54.81 BILATERAL OCCIPITAL NEURALGIA: ICD-10-CM

## 2024-01-23 DIAGNOSIS — R76.8 POSITIVE ANA (ANTINUCLEAR ANTIBODY): ICD-10-CM

## 2024-01-23 DIAGNOSIS — M54.2 NECK PAIN: Primary | ICD-10-CM

## 2024-01-23 LAB
BACTERIA UR QL AUTO: NORMAL /HPF
BILIRUB UR QL STRIP: NEGATIVE
C3 SERPL-MCNC: 144 MG/DL (ref 87–200)
C4 SERPL-MCNC: 38 MG/DL (ref 19–52)
CLARITY UR: CLEAR
COLOR UR: COLORLESS
CREAT UR-MCNC: 10.7 MG/DL
GLUCOSE UR STRIP-MCNC: NEGATIVE MG/DL
HGB UR QL STRIP.AUTO: NEGATIVE
KETONES UR STRIP-MCNC: NEGATIVE MG/DL
LEUKOCYTE ESTERASE UR QL STRIP: NEGATIVE
NITRITE UR QL STRIP: NEGATIVE
NON-SQ EPI CELLS URNS QL MICRO: NORMAL /HPF
PH UR STRIP.AUTO: 6.5 [PH]
PROT UR STRIP-MCNC: NEGATIVE MG/DL
PROT UR-MCNC: <4 MG/DL
RBC #/AREA URNS AUTO: NORMAL /HPF
SP GR UR STRIP.AUTO: 1 (ref 1–1.03)
UROBILINOGEN UR STRIP-ACNC: <2 MG/DL
WBC #/AREA URNS AUTO: NORMAL /HPF

## 2024-01-23 PROCEDURE — 85613 RUSSELL VIPER VENOM DILUTED: CPT

## 2024-01-23 PROCEDURE — 86225 DNA ANTIBODY NATIVE: CPT

## 2024-01-23 PROCEDURE — 86235 NUCLEAR ANTIGEN ANTIBODY: CPT

## 2024-01-23 PROCEDURE — 86160 COMPLEMENT ANTIGEN: CPT

## 2024-01-23 PROCEDURE — 81001 URINALYSIS AUTO W/SCOPE: CPT | Performed by: INTERNAL MEDICINE

## 2024-01-23 PROCEDURE — 85670 THROMBIN TIME PLASMA: CPT

## 2024-01-23 PROCEDURE — 86200 CCP ANTIBODY: CPT

## 2024-01-23 PROCEDURE — 99214 OFFICE O/P EST MOD 30 MIN: CPT | Performed by: PHYSICAL MEDICINE & REHABILITATION

## 2024-01-23 PROCEDURE — 86147 CARDIOLIPIN ANTIBODY EA IG: CPT

## 2024-01-23 PROCEDURE — 82570 ASSAY OF URINE CREATININE: CPT | Performed by: INTERNAL MEDICINE

## 2024-01-23 PROCEDURE — 86146 BETA-2 GLYCOPROTEIN ANTIBODY: CPT

## 2024-01-23 PROCEDURE — 85732 THROMBOPLASTIN TIME PARTIAL: CPT

## 2024-01-23 PROCEDURE — 85705 THROMBOPLASTIN INHIBITION: CPT

## 2024-01-23 PROCEDURE — 84156 ASSAY OF PROTEIN URINE: CPT | Performed by: INTERNAL MEDICINE

## 2024-01-23 PROCEDURE — 36415 COLL VENOUS BLD VENIPUNCTURE: CPT

## 2024-01-23 NOTE — PROGRESS NOTES
Pain Medicine Follow-Up Note    Assessment:  1. Neck pain    2. Bilateral occipital neuralgia        Plan:  Ms. Cintron is a pleasant 53-year-old female significant history of restless leg syndrome, migraines and neck pain who is currently in the process of rheumatologic workup as well as continued chronic neck pain unrelieved with previous measures including cervical medial branch blocks and medication management with neurology.  She has been on Aimovig for several months, Cymbalta, naproxen, nortriptyline, Topamax with again no relief in her pain.  During today's evaluation patient is demonstrating bilateral head and neck pain with suspected cervicogenic headaches and questionable occipital neuralgia.  At this time we will plan for right-sided greater simple nerve block under ultrasound guidance for both diagnostic and therapeutic purposes.  I also reach out to Dr. Parsons regarding next steps and further management    Complete risks and benefits including bleeding, infection, tissue reaction, nerve injury and allergic reaction were discussed. The approach was demonstrated using models and literature was provided. Verbal and written consent was obtained.    History of Present Illness:    Meredith Cintron is a 53 y.o. female who presents to [facility] for interval re-evaluation of the above stated pain complaints. The patient has a past medical and chronic pain history as outlined in the assessment section.  Patient presents for follow-up and reevaluation regarding ongoing neck pain and head pain currently rated 6 out of 10 and described as a constant dull ache that is worse in the morning in the evening.  Presents today for follow-up and reevaluation.  We recently attempted a C3-C5 bilateral MBB block December 21, 2023 with no significant improvements in her pain unfortunately.  Presents for follow-up.    Other than as stated above, the patient denies any interval changes in medications, medical  condition, mental condition, symptoms, or allergies since the last office visit.         Review of Systems:    Review of Systems   Constitutional:  Negative for unexpected weight change.   HENT:  Negative for ear pain.    Eyes:  Negative for visual disturbance.   Respiratory:  Negative for shortness of breath and wheezing.    Gastrointestinal:  Negative for abdominal pain.   Musculoskeletal:  Positive for back pain and gait problem.        Decreased ROM   Neurological:  Positive for dizziness and weakness. Negative for numbness.   Psychiatric/Behavioral:  Positive for sleep disturbance. Negative for decreased concentration. The patient is nervous/anxious.          Past Medical History:   Diagnosis Date    Chronic pain     head and neck     Tension headache        Past Surgical History:   Procedure Laterality Date    EPIDURAL BLOCK INJECTION N/A 10/11/2023    Procedure: C7-T1  CERVICAL epidural steroid injection ([id number]);  Surgeon: Romero Garcia DO;  Location: [facility] MAIN OR;  Service: Pain Management     NERVE BLOCK Bilateral 12/20/2023    Procedure: B/L C3 C4 C5 MEDIAL BRANCH BLOCK #1;  Surgeon: Romero Garcia DO;  Location: [facility] MAIN OR;  Service: Pain Management     PILONIDAL CYST DRAINAGE      [place] I&D SOFT TISSUE ABSCESS SUBFASCIAL N/A 1/11/2017    Procedure: INCISION AND DRAINAGE (I&D) BUTTOCK;  Surgeon: Antolin Peralta MD;  Location: [facility] MAIN OR;  Service: General    SHOULDER ARTHROSCOPY W/ ROTATOR CUFF REPAIR Left        Family History   Problem Relation Age of Onset    Dementia Mother     No Known Problems Father        Social History     Occupational History    Not on file   Tobacco Use    Smoking status: Former     Current packs/day: 0.50     Average packs/day: 0.5 packs/day for 20.0 years (10.0 ttl pk-yrs)     Types: Cigarettes    Smokeless tobacco: Never   Vaping Use    Vaping status: Every Day    Substances: Flavoring   Substance and Sexual Activity    Alcohol use: No    Drug use: No    Sexual  activity: Not on file         Current Outpatient Medications:     Aimovig 140 MG/ML SOAJ, INJECT 1ML SUBCUTANEOUSLY EVERY MONTH IN THE ABDOMEN THIGH, Disp: 1 mL, Rfl: 0    carbidopa-levodopa (SINEMET)  mg per tablet, Take 1 tablet by mouth daily, Disp: , Rfl:     DULoxetine HCl 40 MG CPEP, 60 mg, Disp: , Rfl:     Naproxen Sodium (ALEVE) 220 MG CAPS, Take by mouth if needed, Disp: , Rfl:     nortriptyline (PAMELOR) 50 mg capsule, Take 50 mg by mouth daily at bedtime, Disp: , Rfl:     topiramate (TOPAMAX) 200 MG tablet, Take 200 mg by mouth daily, Disp: , Rfl:     No Known Allergies    Physical Exam:    /72   Pulse 78   Wt 98 kg (216 lb)   BMI 40.81 kg/m²     Constitutional:normal, well developed, well nourished, alert, in no distress and non-toxic and no overt pain behavior.  Eyes:anicteric  HEENT:grossly intact  Neck:supple, symmetric, trachea midline and no masses   Pulmonary:even and unlabored  Cardiovascular:No edema or pitting edema present  Skin:Normal without rashes or lesions and well hydrated  Psychiatric:Mood and affect appropriate  Neurologic:Cranial Nerves II-XII grossly intact  Musculoskeletal:normal gait, tenderness to palpation bilateral cervical paraspinals, positive Tinel's bilateral occiput, MMT 5 out of 5 bilateral upper extremities, negative Spurling bilaterally      Imaging  No orders to display         No orders of the defined types were placed in this encounter.

## 2024-01-24 ENCOUNTER — OFFICE VISIT (OUTPATIENT)
Dept: OBGYN CLINIC | Facility: CLINIC | Age: 54
End: 2024-01-24
Payer: COMMERCIAL

## 2024-01-24 VITALS
HEART RATE: 89 BPM | WEIGHT: 216 LBS | HEIGHT: 61 IN | BODY MASS INDEX: 40.78 KG/M2 | DIASTOLIC BLOOD PRESSURE: 81 MMHG | SYSTOLIC BLOOD PRESSURE: 133 MMHG

## 2024-01-24 DIAGNOSIS — M25.852 LEFT HIP IMPINGEMENT SYNDROME: Primary | ICD-10-CM

## 2024-01-24 DIAGNOSIS — R10.32 LEFT GROIN PAIN: ICD-10-CM

## 2024-01-24 LAB
CENTROMERE B AB SER-ACNC: <0.2 AI (ref 0–0.9)
DSDNA AB SER-ACNC: 1 IU/ML (ref 0–9)
ENA RNP AB SER-ACNC: <0.2 AI (ref 0–0.9)
ENA SCL70 AB SER-ACNC: <0.2 AI (ref 0–0.9)
ENA SM AB SER-ACNC: <0.2 AI (ref 0–0.9)
ENA SS-A AB SER-ACNC: <0.2 AI (ref 0–0.9)
ENA SS-B AB SER-ACNC: <0.2 AI (ref 0–0.9)

## 2024-01-24 PROCEDURE — 99203 OFFICE O/P NEW LOW 30 MIN: CPT | Performed by: ORTHOPAEDIC SURGERY

## 2024-01-24 NOTE — PROGRESS NOTES
Assessment/Plan:  1. Left hip impingement syndrome        2. Left groin pain  Ambulatory Referral to Orthopedic Surgery        The patient may have some impingement of the left hip causing her intermittent pain. I am reassured she is not having constant pain and has good ROM of the hip.  This groin pain does not appear to be rheumatologic in nature. She does have some weakness of her hip flexors and abductors and I would like her to work on strengthening for this. If she fails to make progress we discussed possible MRI vs steroid injection for the hip. She will Fu in 6-8 weeks for repeat evaluation.     Subjective:   Meredith Cintron is a 53 y.o. female who presents today for evaluation of her left hip. She notes intermittent pain about the left groin over the last year or so.  She denies any injury or inciting event prior to the onset of her symptoms.  This is very random and there will be weeks where the groin does not bother her at all, and weeks where she has several episodes of pain.  She notes the longest these episodes have lasted is about half hour.  She cannot identify any specific aggravating factors.  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  She still notes good range of motion of the hip and good strength.  She denies any paresthesias of the left lower extremity.  She denies any mechanical symptoms about the hip. The patient does see rheumatology as she had previously tested positive for ALEC.  She has only had 1 initial evaluation with them.  More blood work was ordered.  The patient has not been started on any medication or given a definitive diagnosis yet.  The patient denies any other joints bothering her at this point.      Review of Systems   Constitutional: Negative.  Negative for chills and fever.   HENT: Negative.  Negative for ear pain and sore throat.    Eyes: Negative.   Negative for pain and redness.   Respiratory: Negative.  Negative for shortness of breath and wheezing.    Cardiovascular:  Negative for chest pain and palpitations.   Gastrointestinal: Negative.  Negative for abdominal pain and blood in stool.   Endocrine: Negative.  Negative for polydipsia and polyuria.   Genitourinary: Negative.  Negative for difficulty urinating and dysuria.   Musculoskeletal:         As noted in HPI   Skin: Negative.  Negative for pallor and rash.   Neurological: Negative.  Negative for dizziness and numbness.   Hematological: Negative.  Negative for adenopathy. Does not bruise/bleed easily.   Psychiatric/Behavioral: Negative.  Negative for confusion and suicidal ideas.          Past Medical History:   Diagnosis Date   • Chronic pain     head and neck    • Tension headache        Past Surgical History:   Procedure Laterality Date   • EPIDURAL BLOCK INJECTION N/A 10/11/2023    Procedure: C7-T1  CERVICAL epidural steroid injection ([id number]);  Surgeon: Romero Garcia DO;  Location: [facility] MAIN OR;  Service: Pain Management    • NERVE BLOCK Bilateral 12/20/2023    Procedure: B/L C3 C4 C5 MEDIAL BRANCH BLOCK #1;  Surgeon: Romero Garcia DO;  Location: [facility] MAIN OR;  Service: Pain Management    • PILONIDAL CYST DRAINAGE     • [place] I&D SOFT TISSUE ABSCESS SUBFASCIAL N/A 1/11/2017    Procedure: INCISION AND DRAINAGE (I&D) BUTTOCK;  Surgeon: Antolin Peralta MD;  Location: [facility] MAIN OR;  Service: General   • SHOULDER ARTHROSCOPY W/ ROTATOR CUFF REPAIR Left        Family History   Problem Relation Age of Onset   • Dementia Mother    • No Known Problems Father        Social History     Occupational History   • Not on file   Tobacco Use   • Smoking status: Former     Current packs/day: 0.50     Average packs/day: 0.5 packs/day for 20.0 years (10.0 ttl pk-yrs)     Types: Cigarettes   • Smokeless tobacco: Never   Vaping Use   • Vaping status: Every Day   • Substances: Flavoring   Substance and Sexual Activity    • Alcohol use: No   • Drug use: No   • Sexual activity: Not on file         Current Outpatient Medications:   •  Aimovig 140 MG/ML SOAJ, INJECT 1ML SUBCUTANEOUSLY EVERY MONTH IN THE ABDOMEN THIGH, Disp: 1 mL, Rfl: 0  •  carbidopa-levodopa (SINEMET)  mg per tablet, Take 1 tablet by mouth daily, Disp: , Rfl:   •  DULoxetine HCl 40 MG CPEP, 60 mg, Disp: , Rfl:   •  Naproxen Sodium (ALEVE) 220 MG CAPS, Take by mouth if needed, Disp: , Rfl:   •  nortriptyline (PAMELOR) 50 mg capsule, Take 50 mg by mouth daily at bedtime, Disp: , Rfl:   •  topiramate (TOPAMAX) 200 MG tablet, Take 200 mg by mouth daily, Disp: , Rfl:     No Known Allergies    Objective:  Vitals:    01/24/24 1707   BP: 133/81   Pulse: 89     Pain Score:   6      Right Hip Exam     Tenderness   The patient is experiencing no tenderness.     Range of Motion   The patient has normal right hip ROM.    Muscle Strength   Flexion: 5/5     Other   Erythema: absent  Sensation: normal  Pulse: present      Left Hip Exam     Tenderness   The patient is experiencing no tenderness.     Range of Motion   Flexion:  120   External rotation:  70   Internal rotation: 25     Muscle Strength   Flexion: 4/5     Other   Erythema: absent  Sensation: normal  Pulse: present    Comments:  Negative log roll. 4/5 strength hip abduction.             Physical Exam  Constitutional:       General: She is not in acute distress.     Appearance: She is well-developed.   HENT:      Head: Normocephalic and atraumatic.   Eyes:      General: No scleral icterus.     Conjunctiva/sclera: Conjunctivae normal.   Neck:      Vascular: No JVD.   Cardiovascular:      Rate and Rhythm: Normal rate.   Pulmonary:      Effort: Pulmonary effort is normal. No respiratory distress.   Musculoskeletal:      Comments: As per HPI   Skin:     General: Skin is warm.   Neurological:      Mental Status: She is alert and oriented to person, place, and time.      Coordination: Coordination normal.         I have  personally reviewed pertinent films in PACS and my interpretation is as follows:  Xrays left hip: No acute osseous abnormality. Prominence lateral femoral head/neck junction.       This document was created using speech voice recognition software.   Grammatical errors, random word insertions, pronoun errors, and incomplete sentences are an occasional consequence of this system due to software limitations, ambient noise, and hardware issues.   Any formal questions or concerns about content, text, or information contained within the body of this dictation should be directly addressed to the provider for clarification.

## 2024-01-25 LAB
APTT SCREEN TO CONFIRM RATIO: 1.14 RATIO (ref 0–1.34)
CONFIRM APTT/NORMAL: 44.3 SEC (ref 0–47.6)
LA PPP-IMP: NORMAL
SCREEN APTT: 41.2 SEC (ref 0–43.5)
SCREEN DRVVT: 36.7 SEC (ref 0–47)
THROMBIN TIME: 19.3 SEC (ref 0–23)

## 2024-01-26 LAB
B2 GLYCOPROT1 IGA SERPL IA-ACNC: 2
B2 GLYCOPROT1 IGG SERPL IA-ACNC: 1
B2 GLYCOPROT1 IGM SERPL IA-ACNC: <2.4
CARDIOLIPIN IGA SER IA-ACNC: 2
CARDIOLIPIN IGG SER IA-ACNC: 2
CARDIOLIPIN IGM SER IA-ACNC: <0.9
CCP AB SER IA-ACNC: 1.2

## 2024-01-29 ENCOUNTER — HOSPITAL ENCOUNTER (OUTPATIENT)
Dept: RADIOLOGY | Facility: HOSPITAL | Age: 54
Discharge: HOME | End: 2024-01-29
Payer: COMMERCIAL

## 2024-01-29 DIAGNOSIS — R07.1 PAINFUL RESPIRATION: ICD-10-CM

## 2024-01-29 DIAGNOSIS — G43.009 ATYPICAL MIGRAINE: ICD-10-CM

## 2024-01-29 PROCEDURE — 71046 X-RAY EXAM CHEST 2 VIEWS: CPT

## 2024-01-29 RX ORDER — ERENUMAB-AOOE 140 MG/ML
INJECTION, SOLUTION SUBCUTANEOUS
Qty: 1 ML | Refills: 0 | Status: SHIPPED | OUTPATIENT
Start: 2024-01-29

## 2024-02-02 ENCOUNTER — PROCEDURE VISIT (OUTPATIENT)
Dept: PAIN MEDICINE | Facility: CLINIC | Age: 54
End: 2024-02-02
Payer: COMMERCIAL

## 2024-02-02 VITALS — DIASTOLIC BLOOD PRESSURE: 79 MMHG | SYSTOLIC BLOOD PRESSURE: 125 MMHG | HEART RATE: 92 BPM

## 2024-02-02 DIAGNOSIS — M54.81 BILATERAL OCCIPITAL NEURALGIA: Primary | ICD-10-CM

## 2024-02-02 PROCEDURE — 76942 ECHO GUIDE FOR BIOPSY: CPT | Performed by: PHYSICAL MEDICINE & REHABILITATION

## 2024-02-02 PROCEDURE — 64405 NJX AA&/STRD GR OCPL NRV: CPT | Performed by: PHYSICAL MEDICINE & REHABILITATION

## 2024-02-02 RX ORDER — BUPIVACAINE HYDROCHLORIDE 2.5 MG/ML
3 INJECTION, SOLUTION EPIDURAL; INFILTRATION; INTRACAUDAL ONCE
Status: COMPLETED | OUTPATIENT
Start: 2024-02-02 | End: 2024-02-02

## 2024-02-02 RX ORDER — METHYLPREDNISOLONE ACETATE 40 MG/ML
40 INJECTION, SUSPENSION INTRA-ARTICULAR; INTRALESIONAL; INTRAMUSCULAR; SOFT TISSUE ONCE
Status: COMPLETED | OUTPATIENT
Start: 2024-02-02 | End: 2024-02-02

## 2024-02-02 RX ORDER — LIDOCAINE HYDROCHLORIDE 10 MG/ML
2 INJECTION, SOLUTION INFILTRATION; PERINEURAL ONCE
Status: COMPLETED | OUTPATIENT
Start: 2024-02-02 | End: 2024-02-02

## 2024-02-02 RX ADMIN — LIDOCAINE HYDROCHLORIDE 2 ML: 10 INJECTION, SOLUTION INFILTRATION; PERINEURAL at 09:39

## 2024-02-02 RX ADMIN — METHYLPREDNISOLONE ACETATE 40 MG: 40 INJECTION, SUSPENSION INTRA-ARTICULAR; INTRALESIONAL; INTRAMUSCULAR; SOFT TISSUE at 09:39

## 2024-02-02 RX ADMIN — BUPIVACAINE HYDROCHLORIDE 3 ML: 2.5 INJECTION, SOLUTION EPIDURAL; INFILTRATION; INTRACAUDAL at 09:38

## 2024-02-02 NOTE — PROGRESS NOTES
Indication: Occipital headaches.   Preoperative diagnosis: Greater occipital neuralgia.  Postoperative diagnosis: Greater occipital neuralgia.  Procedure: Ultrasound-guided right greater occipital nerve block.     After discussing the risks, benefits, and alternatives to the procedure, the patient expressed understanding and wished to proceed. The patient was brought to the procedure suite and placed in the prone position. A procedural pause was conducted to verify: correct patient identity, procedure to be performed and as applicable, correct side and site, correct patient position, and availability of implants, special equipment or special requirements. A simple surgical tray was used. Prior to the procedure, the upper cervical spine was examined with a 12 MHz linear transducer to visualize the spinous process, suboccipital musculature, greater occipital nerve, occipital artery, and to determine the optimal needle path. Following this, the area was prepared with a ChloraPrep scrub, then re-examined using the same transducer, a sterile ultrasound transducer cover, and sterile ultrasound transducer gel. Thereafter, using continuous ultrasound guidance, a 2.5-inch 25-gauge needle was advanced in close proximity to the greater occipital nerve. After visualization of the tip and negative aspiration for blood, a mixture of 20 mg of Depo-Medrol in 2 cc of 0.25% bupivacaine was injected into the target site. The patient tolerated the procedure well and there were no apparent complications. After an appropriate amount of observation, the patient was dismissed from the clinic in good condition under their own power.

## 2024-02-09 ENCOUNTER — TELEPHONE (OUTPATIENT)
Dept: PAIN MEDICINE | Facility: CLINIC | Age: 54
End: 2024-02-09

## 2024-02-09 NOTE — TELEPHONE ENCOUNTER
Pt reports 40-50% improvement post inj   Pain level 2/10  Pt aware I will call next week for an update

## 2024-03-11 ENCOUNTER — HOSPITAL ENCOUNTER (OUTPATIENT)
Dept: RADIOLOGY | Facility: HOSPITAL | Age: 54
Discharge: HOME | End: 2024-03-11
Payer: COMMERCIAL

## 2024-03-11 VITALS — BODY MASS INDEX: 37.76 KG/M2 | HEIGHT: 61 IN | WEIGHT: 200 LBS

## 2024-03-11 DIAGNOSIS — R10.2 PELVIC PAIN: ICD-10-CM

## 2024-03-11 DIAGNOSIS — Z12.31 ENCOUNTER FOR SCREENING MAMMOGRAM FOR MALIGNANT NEOPLASM OF BREAST: ICD-10-CM

## 2024-03-11 PROCEDURE — 77067 SCR MAMMO BI INCL CAD: CPT

## 2024-03-11 PROCEDURE — 77063 BREAST TOMOSYNTHESIS BI: CPT

## 2024-03-13 ENCOUNTER — OFFICE VISIT (OUTPATIENT)
Dept: OBGYN CLINIC | Facility: CLINIC | Age: 54
End: 2024-03-13
Payer: COMMERCIAL

## 2024-03-13 ENCOUNTER — APPOINTMENT (OUTPATIENT)
Dept: RADIOLOGY | Facility: CLINIC | Age: 54
End: 2024-03-13
Payer: COMMERCIAL

## 2024-03-13 VITALS
WEIGHT: 200 LBS | HEIGHT: 61 IN | DIASTOLIC BLOOD PRESSURE: 76 MMHG | SYSTOLIC BLOOD PRESSURE: 131 MMHG | BODY MASS INDEX: 37.76 KG/M2 | HEART RATE: 99 BPM

## 2024-03-13 DIAGNOSIS — R10.32 LEFT GROIN PAIN: ICD-10-CM

## 2024-03-13 DIAGNOSIS — M25.852 LEFT HIP IMPINGEMENT SYNDROME: ICD-10-CM

## 2024-03-13 DIAGNOSIS — R10.32 LEFT GROIN PAIN: Primary | ICD-10-CM

## 2024-03-13 PROCEDURE — 99213 OFFICE O/P EST LOW 20 MIN: CPT | Performed by: ORTHOPAEDIC SURGERY

## 2024-03-13 PROCEDURE — 73502 X-RAY EXAM HIP UNI 2-3 VIEWS: CPT

## 2024-03-13 RX ORDER — CYCLOBENZAPRINE HCL 5 MG
5 TABLET ORAL AS NEEDED
COMMUNITY
Start: 2024-01-29

## 2024-03-13 NOTE — PROGRESS NOTES
Assessment/Plan:  1. Left groin pain  XR hip/pelv 2-3 vws left if performed    MRI arthrogram left hip    FL injection left hip (arthrogram)      2. Left hip impingement syndrome  XR hip/pelv 2-3 vws left if performed          The patient has not made progress with conservative treatment thus far and thus I do feel an MR arthrogram is warranted to rule out labral tear. She will FU after her MRI to discuss the results and how to proceed.     Subjective:   Meredith Cintron is a 53 y.o. female who presents today for follow-up of her left groin. She has been doing some PT exercises, as she does work at a PT office, however these exercises seem to aggravate her hip. She continues to complain of intermittent sharp pain about the groin at random times, which usually resolves relatively quickly. She has minimal pain in between these episodes. She did have a positive ALEC, and has seen rheumatology for more bloodwork however she has not had a FU for this yet. She is not on any rheumatologic medications. Besides some neck and back issues, she does not complain of any other joint pain. She notes good sensation of the left lower extremity.       Review of Systems   Constitutional: Negative.  Negative for chills and fever.   HENT: Negative.  Negative for ear pain and sore throat.    Eyes: Negative.  Negative for pain and redness.   Respiratory: Negative.  Negative for shortness of breath and wheezing.    Cardiovascular:  Negative for chest pain and palpitations.   Gastrointestinal: Negative.  Negative for abdominal pain and blood in stool.   Endocrine: Negative.  Negative for polydipsia and polyuria.   Genitourinary: Negative.  Negative for difficulty urinating and dysuria.   Musculoskeletal:         As noted in HPI   Skin: Negative.  Negative for pallor and rash.   Neurological: Negative.  Negative for dizziness and numbness.   Hematological: Negative.  Negative for adenopathy. Does not bruise/bleed easily.   Psychiatric/Behavioral:  Negative.  Negative for confusion and suicidal ideas.          Past Medical History:   Diagnosis Date   • Chronic pain     head and neck    • Tension headache        Past Surgical History:   Procedure Laterality Date   • EPIDURAL BLOCK INJECTION N/A 10/11/2023    Procedure: C7-T1  CERVICAL epidural steroid injection (62782);  Surgeon: Romero Garcia DO;  Location: [facility] MAIN OR;  Service: Pain Management    • NERVE BLOCK Bilateral 12/20/2023    Procedure: B/L C3 C4 C5 MEDIAL BRANCH BLOCK #1;  Surgeon: Romero Garcia DO;  Location: [facility] MAIN OR;  Service: Pain Management    • PILONIDAL CYST DRAINAGE     • MS I&D SOFT TISSUE ABSCESS SUBFASCIAL N/A 1/11/2017    Procedure: INCISION AND DRAINAGE (I&D) BUTTOCK;  Surgeon: Antolin Peralta MD;  Location: [facility] MAIN OR;  Service: General   • SHOULDER ARTHROSCOPY W/ ROTATOR CUFF REPAIR Left        Family History   Problem Relation Age of Onset   • Dementia Mother    • No Known Problems Father    • Prostate cancer Maternal Grandfather        Social History     Occupational History   • Not on file   Tobacco Use   • Smoking status: Former     Current packs/day: 0.50     Average packs/day: 0.5 packs/day for 20.0 years (10.0 ttl pk-yrs)     Types: Cigarettes   • Smokeless tobacco: Never   Vaping Use   • Vaping status: Every Day   • Substances: Flavoring   Substance and Sexual Activity   • Alcohol use: No   • Drug use: No   • Sexual activity: Not on file         Current Outpatient Medications:   •  Aimovig 140 MG/ML SOAJ, INJECT 1ML SUBCUTANEOUSLY EVERY MONTH IN THE ABDOMEN THIGH, Disp: 1 mL, Rfl: 0  •  carbidopa-levodopa (SINEMET)  mg per tablet, Take 1 tablet by mouth daily, Disp: , Rfl:   •  cyclobenzaprine (FLEXERIL) 5 mg tablet, 5 mg if needed, Disp: , Rfl:   •  DULoxetine HCl 40 MG CPEP, 60 mg, Disp: , Rfl:   •  Naproxen Sodium (ALEVE) 220 MG CAPS, Take by mouth if needed, Disp: , Rfl:   •  nortriptyline (PAMELOR) 50 mg capsule, Take 50 mg by mouth daily at bedtime,  Disp: , Rfl:   •  topiramate (TOPAMAX) 200 MG tablet, Take 200 mg by mouth daily, Disp: , Rfl:     No Known Allergies    Objective:  Vitals:    03/13/24 1658   BP: 131/76   Pulse: 99     Pain Score:   5      Ortho Exam         Left Hip Exam      Tenderness   The patient is experiencing no tenderness.      Range of Motion   Flexion:  120   External rotation:  70   Internal rotation: 25      Muscle Strength   Flexion: 4/5      Other   Erythema: absent  Sensation: normal  Pulse: present     Comments:  Negative log roll.   Physical Exam  Constitutional:       General: She is not in acute distress.     Appearance: She is well-developed.   HENT:      Head: Normocephalic and atraumatic.   Eyes:      General: No scleral icterus.     Conjunctiva/sclera: Conjunctivae normal.   Neck:      Vascular: No JVD.   Cardiovascular:      Rate and Rhythm: Normal rate.   Pulmonary:      Effort: Pulmonary effort is normal. No respiratory distress.   Musculoskeletal:      Comments: As per HPI   Skin:     General: Skin is warm.   Neurological:      Mental Status: She is alert and oriented to person, place, and time.      Coordination: Coordination normal.         I have personally reviewed pertinent films in PACS and my interpretation is as follows:  Xrays left hip:  No acute osseous abnormality. Prominence lateral femoral head/neck junction.      This document was created using speech voice recognition software.   Grammatical errors, random word insertions, pronoun errors, and incomplete sentences are an occasional consequence of this system due to software limitations, ambient noise, and hardware issues.   Any formal questions or concerns about content, text, or information contained within the body of this dictation should be directly addressed to the provider for clarification.

## 2024-03-14 ENCOUNTER — TELEPHONE (OUTPATIENT)
Dept: PAIN MEDICINE | Facility: CLINIC | Age: 54
End: 2024-03-14

## 2024-03-26 ENCOUNTER — PROCEDURE VISIT (OUTPATIENT)
Dept: PAIN MEDICINE | Facility: CLINIC | Age: 54
End: 2024-03-26
Payer: COMMERCIAL

## 2024-03-26 VITALS — HEART RATE: 89 BPM | TEMPERATURE: 98.2 F | DIASTOLIC BLOOD PRESSURE: 72 MMHG | SYSTOLIC BLOOD PRESSURE: 138 MMHG

## 2024-03-26 DIAGNOSIS — M54.81 BILATERAL OCCIPITAL NEURALGIA: Primary | ICD-10-CM

## 2024-03-26 PROCEDURE — 64405 NJX AA&/STRD GR OCPL NRV: CPT | Performed by: PHYSICAL MEDICINE & REHABILITATION

## 2024-03-26 PROCEDURE — 76942 ECHO GUIDE FOR BIOPSY: CPT | Performed by: PHYSICAL MEDICINE & REHABILITATION

## 2024-03-26 RX ORDER — BUPIVACAINE HYDROCHLORIDE 2.5 MG/ML
3 INJECTION, SOLUTION EPIDURAL; INFILTRATION; INTRACAUDAL ONCE
Status: COMPLETED | OUTPATIENT
Start: 2024-03-26 | End: 2024-03-26

## 2024-03-26 RX ORDER — LIDOCAINE HYDROCHLORIDE 10 MG/ML
2 INJECTION, SOLUTION INFILTRATION; PERINEURAL ONCE
Status: CANCELLED | OUTPATIENT
Start: 2024-03-26 | End: 2024-03-26

## 2024-03-26 RX ORDER — METHYLPREDNISOLONE ACETATE 40 MG/ML
40 INJECTION, SUSPENSION INTRA-ARTICULAR; INTRALESIONAL; INTRAMUSCULAR; SOFT TISSUE ONCE
Status: COMPLETED | OUTPATIENT
Start: 2024-03-26 | End: 2024-03-26

## 2024-03-26 RX ADMIN — METHYLPREDNISOLONE ACETATE 40 MG: 40 INJECTION, SUSPENSION INTRA-ARTICULAR; INTRALESIONAL; INTRAMUSCULAR; SOFT TISSUE at 10:21

## 2024-03-26 RX ADMIN — BUPIVACAINE HYDROCHLORIDE 3 ML: 2.5 INJECTION, SOLUTION EPIDURAL; INFILTRATION; INTRACAUDAL at 10:21

## 2024-03-26 NOTE — PROGRESS NOTES
Indication: Occipital headaches.   Preoperative diagnosis: Greater occipital neuralgia.  Postoperative diagnosis: Greater occipital neuralgia.  Procedure: Ultrasound-guided left greater occipital nerve block.     After discussing the risks, benefits, and alternatives to the procedure, the patient expressed understanding and wished to proceed. The patient was brought to the procedure suite and placed in the prone position. A procedural pause was conducted to verify: correct patient identity, procedure to be performed and as applicable, correct side and site, correct patient position, and availability of implants, special equipment or special requirements. A simple surgical tray was used. Prior to the procedure, the upper cervical spine was examined with a 12 MHz linear transducer to visualize the spinous process, suboccipital musculature, greater occipital nerve, occipital artery, and to determine the optimal needle path. Following this, the area was prepared with a ChloraPrep scrub, then re-examined using the same transducer, a sterile ultrasound transducer cover, and sterile ultrasound transducer gel. Thereafter, using continuous ultrasound guidance, a 2.5-inch 25-gauge needle was advanced in close proximity to the greater occipital nerve. After visualization of the tip and negative aspiration for blood, a mixture of 20 mg of Depo-Medrol in 2 cc of 0.25% bupivacaine was injected into the target site. The patient tolerated the procedure well and there were no apparent complications. After an appropriate amount of observation, the patient was dismissed from the clinic in good condition under their own power.

## 2024-04-02 ENCOUNTER — TELEPHONE (OUTPATIENT)
Dept: PAIN MEDICINE | Facility: CLINIC | Age: 54
End: 2024-04-02

## 2024-04-02 NOTE — TELEPHONE ENCOUNTER
Pt reports 40-50% improvement post inj   Pain level 5/10  Pt aware I will call next week for an update

## 2024-04-08 ENCOUNTER — HOSPITAL ENCOUNTER (OUTPATIENT)
Dept: RADIOLOGY | Facility: HOSPITAL | Age: 54
Discharge: HOME | End: 2024-04-08
Payer: COMMERCIAL

## 2024-04-08 ENCOUNTER — HOSPITAL ENCOUNTER (OUTPATIENT)
Dept: RADIOLOGY | Facility: HOSPITAL | Age: 54
Discharge: HOME | End: 2024-04-08
Admitting: RADIOLOGY
Payer: COMMERCIAL

## 2024-04-08 DIAGNOSIS — R10.32 LEFT GROIN PAIN: ICD-10-CM

## 2024-04-08 PROCEDURE — 27093 INJECTION FOR HIP X-RAY: CPT

## 2024-04-08 PROCEDURE — 73722 MRI JOINT OF LWR EXTR W/DYE: CPT

## 2024-04-08 PROCEDURE — 77002 NEEDLE LOCALIZATION BY XRAY: CPT

## 2024-04-08 PROCEDURE — A9585 GADOBUTROL INJECTION: HCPCS | Performed by: PHYSICIAN ASSISTANT

## 2024-04-08 RX ORDER — LIDOCAINE HYDROCHLORIDE 10 MG/ML
5 INJECTION, SOLUTION EPIDURAL; INFILTRATION; INTRACAUDAL; PERINEURAL
Status: COMPLETED | OUTPATIENT
Start: 2024-04-08 | End: 2024-04-08

## 2024-04-08 RX ORDER — GADOBUTROL 604.72 MG/ML
0.2 INJECTION INTRAVENOUS
Status: COMPLETED | OUTPATIENT
Start: 2024-04-08 | End: 2024-04-08

## 2024-04-08 RX ADMIN — IOHEXOL 3 ML: 240 INJECTION, SOLUTION INTRATHECAL; INTRAVASCULAR; INTRAVENOUS; ORAL at 14:43

## 2024-04-08 RX ADMIN — LIDOCAINE HYDROCHLORIDE 5 ML: 10 INJECTION, SOLUTION EPIDURAL; INFILTRATION; INTRACAUDAL; PERINEURAL at 14:43

## 2024-04-08 RX ADMIN — GADOBUTROL 0.2 ML: 604.72 INJECTION INTRAVENOUS at 14:54

## 2024-04-15 ENCOUNTER — TELEPHONE (OUTPATIENT)
Dept: NEUROLOGY | Facility: CLINIC | Age: 54
End: 2024-04-15

## 2024-04-17 ENCOUNTER — OFFICE VISIT (OUTPATIENT)
Dept: OBGYN CLINIC | Facility: CLINIC | Age: 54
End: 2024-04-17
Payer: COMMERCIAL

## 2024-04-17 VITALS
DIASTOLIC BLOOD PRESSURE: 73 MMHG | WEIGHT: 200 LBS | HEIGHT: 61 IN | BODY MASS INDEX: 37.76 KG/M2 | HEART RATE: 81 BPM | SYSTOLIC BLOOD PRESSURE: 116 MMHG

## 2024-04-17 DIAGNOSIS — R10.32 LEFT GROIN PAIN: Primary | ICD-10-CM

## 2024-04-17 DIAGNOSIS — M16.12 PRIMARY OSTEOARTHRITIS OF LEFT HIP: ICD-10-CM

## 2024-04-17 DIAGNOSIS — M24.159 LABRAL TEAR OF HIP, DEGENERATIVE: ICD-10-CM

## 2024-04-17 PROCEDURE — 99214 OFFICE O/P EST MOD 30 MIN: CPT | Performed by: ORTHOPAEDIC SURGERY

## 2024-04-17 NOTE — PROGRESS NOTES
Assessment/Plan:  1. Left groin pain        2. Primary osteoarthritis of left hip        3. Labral tear of hip, degenerative          Scribe Attestation    I,:  Sindhu Corbin am acting as a scribe while in the presence of the attending physician.:       I,:  Pedro Prater MD personally performed the services described in this documentation    as scribed in my presence.:         Meredith is a pleasant 53 y.o. female with acute left hip pain. Review of MRI with patient today demonstrates moderate osteoarthritis. Patient does have a moderate sized anterior labral tear however I do not recommend arthroscopic surgery for these types of degenerative tears.  She does have several subchondral cysts indicative of cartilage thinning and defects.  I discussed with patient her treatment options at this time in the form of continued physical therapy, aleve as needed, and left hip intra-articular corticosteroid injection.  We also discussed more aggressive treatment in the form of a hip replacement however she is not ready for this at this point.  Patient will follow up with Dr Hurst for an injection if her pain increases.     Subjective:   Meredith Cintron is a 53 y.o. female who presents for follow up evaluation of left hip pain and MRI review. Patient has had sharp, intermittent, left hip pain localized to the groin for a few months now. Pain is localized to the groin. She has tried physical therapy, OTC NSAIDs, and activity modification with out benefit.       Review of Systems   Constitutional:  Negative for chills and fever.   HENT:  Negative for ear pain and sore throat.    Eyes:  Negative for pain and visual disturbance.   Respiratory:  Negative for cough and shortness of breath.    Cardiovascular:  Negative for chest pain and palpitations.   Gastrointestinal:  Negative for abdominal pain and vomiting.   Genitourinary:  Negative for dysuria and hematuria.   Musculoskeletal:  Negative for arthralgias and back pain.    Skin:  Negative for color change and rash.   Neurological:  Negative for seizures and syncope.   All other systems reviewed and are negative.        Past Medical History:   Diagnosis Date   • Chronic pain     head and neck    • Tension headache        Past Surgical History:   Procedure Laterality Date   • EPIDURAL BLOCK INJECTION N/A 10/11/2023    Procedure: C7-T1  CERVICAL epidural steroid injection (52207);  Surgeon: Romero Garcia DO;  Location: [facility] MAIN OR;  Service: Pain Management    • FL INJECTION LEFT HIP (ARTHROGRAM)  4/8/2024   • NERVE BLOCK Bilateral 12/20/2023    Procedure: B/L C3 C4 C5 MEDIAL BRANCH BLOCK #1;  Surgeon: Romero Garcia DO;  Location: [facility] MAIN OR;  Service: Pain Management    • PILONIDAL CYST DRAINAGE     • [place] I&D SOFT TISSUE ABSCESS SUBFASCIAL N/A 1/11/2017    Procedure: INCISION AND DRAINAGE (I&D) BUTTOCK;  Surgeon: Antolin Peralta MD;  Location: [facility] MAIN OR;  Service: General   • SHOULDER ARTHROSCOPY W/ ROTATOR CUFF REPAIR Left        Family History   Problem Relation Age of Onset   • Dementia Mother    • No Known Problems Father    • Prostate cancer Maternal Grandfather        Social History     Occupational History   • Not on file   Tobacco Use   • Smoking status: Former     Current packs/day: 0.50     Average packs/day: 0.5 packs/day for 20.0 years (10.0 ttl pk-yrs)     Types: Cigarettes   • Smokeless tobacco: Never   Vaping Use   • Vaping status: Every Day   • Substances: Flavoring   Substance and Sexual Activity   • Alcohol use: No   • Drug use: No   • Sexual activity: Not on file         Current Outpatient Medications:   •  Aimovig 140 MG/ML SOAJ, INJECT 1ML SUBCUTANEOUSLY EVERY MONTH IN THE ABDOMEN THIGH, Disp: 1 mL, Rfl: 0  •  carbidopa-levodopa (SINEMET)  mg per tablet, Take 1 tablet by mouth daily, Disp: , Rfl:   •  cyclobenzaprine (FLEXERIL) 5 mg tablet, 5 mg if needed, Disp: , Rfl:   •  DULoxetine HCl 40 MG CPEP, 60 mg, Disp: , Rfl:   •  Naproxen Sodium  (ALEVE) 220 MG CAPS, Take by mouth if needed, Disp: , Rfl:   •  nortriptyline (PAMELOR) 50 mg capsule, Take 50 mg by mouth daily at bedtime, Disp: , Rfl:   •  topiramate (TOPAMAX) 200 MG tablet, Take 200 mg by mouth daily, Disp: , Rfl:     No Known Allergies    Objective:  Vitals:    04/17/24 1204   BP: 116/73   Pulse: 81       Left Hip Exam     Tenderness   The patient is experiencing no tenderness.     Range of Motion   Flexion:  120   External rotation:  70   Internal rotation: 25     Muscle Strength   Flexion: 4/5     Other   Erythema: absent  Sensation: normal  Pulse: present    Comments:    (-) ELISSA, (-) FADIR  (-) Log roll  2+ TP and DP pulses with brisk capillary refill to the toes  Sural, saphenous, tibial, superficial and deep peroneal motor and sensory distribution intact  Sensation to light touch               Physical Exam  Vitals and nursing note reviewed.   Constitutional:       Appearance: Normal appearance.   HENT:      Head: Normocephalic.      Right Ear: External ear normal.      Left Ear: External ear normal.   Eyes:      Extraocular Movements: Extraocular movements intact.      Conjunctiva/sclera: Conjunctivae normal.   Cardiovascular:      Rate and Rhythm: Normal rate.      Pulses: Normal pulses.   Pulmonary:      Effort: Pulmonary effort is normal.      Breath sounds: Normal breath sounds.   Abdominal:      General: Abdomen is flat.   Musculoskeletal:         General: Normal range of motion.      Cervical back: Normal range of motion and neck supple.      Comments: See ortho exam   Skin:     General: Skin is warm and dry.   Neurological:      General: No focal deficit present.      Mental Status: She is alert.   Psychiatric:         Mood and Affect: Mood normal.         Behavior: Behavior normal.         I have personally reviewed pertinent films in PACS and my interpretation is as follows:    MRI of the left hip obtained 4/8/2024 demonstrates moderate left hip osteoarthritis with cystic  changes. Moderate sized anterior superior left acetabular labral tear.     This document was created using speech voice recognition software.   Grammatical errors, random word insertions, pronoun errors, and incomplete sentences are an occasional consequence of this system due to software limitations, ambient noise, and hardware issues.   Any formal questions or concerns about content, text, or information contained within the body of this dictation should be directly addressed to the provider for clarification.

## 2024-04-29 ENCOUNTER — OFFICE VISIT (OUTPATIENT)
Dept: NEUROLOGY | Facility: CLINIC | Age: 54
End: 2024-04-29
Payer: COMMERCIAL

## 2024-04-29 VITALS
TEMPERATURE: 97.4 F | BODY MASS INDEX: 40.22 KG/M2 | HEIGHT: 61 IN | OXYGEN SATURATION: 96 % | WEIGHT: 213 LBS | SYSTOLIC BLOOD PRESSURE: 106 MMHG | HEART RATE: 102 BPM | DIASTOLIC BLOOD PRESSURE: 78 MMHG

## 2024-04-29 DIAGNOSIS — G43.009 ATYPICAL MIGRAINE: Primary | ICD-10-CM

## 2024-04-29 DIAGNOSIS — G44.86 CERVICOGENIC HEADACHE: ICD-10-CM

## 2024-04-29 DIAGNOSIS — M50.30 DDD (DEGENERATIVE DISC DISEASE), CERVICAL: ICD-10-CM

## 2024-04-29 PROCEDURE — 99214 OFFICE O/P EST MOD 30 MIN: CPT | Performed by: PSYCHIATRY & NEUROLOGY

## 2024-04-29 RX ORDER — ERENUMAB-AOOE 140 MG/ML
140 INJECTION, SOLUTION SUBCUTANEOUS
Qty: 3 ML | Refills: 1 | Status: SHIPPED | OUTPATIENT
Start: 2024-04-29

## 2024-04-29 NOTE — PROGRESS NOTES
Return NeuroOutpatient Note        Meredith Cintron  [id number]  53 y.o.  1970       Cervicogenic headache ; DDD (degenerative disc disease), cervical ; and Chronic headaches         History obtained from:  Patient     HPI/Subjective:    Meredith Cintron is a 54 yo F with PMH of cervical DDD, migraines presents as f/u. She was initially seen in June 2023. She has described her headache as originating from base of neck to all over head. Pain is pressure type with shooting quality in between. There is no associated nausea, vomiting. There is no associated light or noise sensitivity. She also reports associated postural dizziness. When she bends down, her headache exaggerates. She had reported about 15 days of headaches a month. When she was on Aimovig, she was getting 6-7 headaches a month.   Being out in sun does worsen the frequency.  She's also on pamelor 50mg daily, cymbalta 60mg daily and topamax 200mg daily.   Patient has been without Aimovig for 3 months. She says she requested for refill. We have no documentation of calls being made to us at all. Last communication was in Dec regarding a letter request and it was sent.      Her MRI cervical spine has revealed C3-C4: Small disc bulge without significant central or foraminal stenosis.  She had 1 epidural injection which helped by 20%. She's had occipital block.          She's had MRI brain and MRA and both were normal.      She's on sinemet for RLS.      Her mother had migraines. Her maternal aunt had migraines.      She also reports left hip referred pain in left groin.       Past Medical History:   Diagnosis Date   • Chronic pain     head and neck    • Tension headache      Social History     Socioeconomic History   • Marital status: /Civil Union     Spouse name: Not on file   • Number of children: Not on file   • Years of education: Not on file   • Highest education level: Not on file   Occupational History   • Not on file   Tobacco Use   • Smoking  status: Former     Current packs/day: 0.50     Average packs/day: 0.5 packs/day for 20.0 years (10.0 ttl pk-yrs)     Types: Cigarettes   • Smokeless tobacco: Never   Vaping Use   • Vaping status: Every Day   • Substances: Flavoring   Substance and Sexual Activity   • Alcohol use: No   • Drug use: No   • Sexual activity: Not on file   Other Topics Concern   • Not on file   Social History Narrative   • Not on file     Social Determinants of Health     Financial Resource Strain: Low Risk  (2/29/2024)    Received from [facility]    Overall Financial Resource Strain (CARDIA)    • Difficulty of Paying Living Expenses: Not hard at all   Food Insecurity: No Food Insecurity (3/4/2024)    Received from [facility]    Hunger Vital Sign    • Worried About Running Out of Food in the Last Year: Never true    • Ran Out of Food in the Last Year: Never true   Transportation Needs: No Transportation Needs (3/4/2024)    Received from [facility]    PRAPARE - Transportation    • Lack of Transportation (Medical): No    • Lack of Transportation (Non-Medical): No   Physical Activity: Insufficiently Active (3/4/2024)    Received from [facility]    Exercise Vital Sign    • Days of Exercise per Week: 3 days    • Minutes of Exercise per Session: 20 min   Stress: Stress Concern Present (3/4/2022)    Received from [facility]    [employer] [place] [employer] - Occupational Stress Questionnaire    • Feeling of Stress : To some extent   Social Connections: Moderately Integrated (3/4/2024)    Received from [facility]    Social Connection and Isolation Panel [NHANES]    • Frequency of Communication with Friends and Family: More than three times a week    • Frequency of Social Gatherings with Friends and Family: More than three times a week    • Attends [employer] Services: 1 to 4 times per year    • Active Member of Clubs or Organizations: No    • Attends Club or Organization Meetings: Never    • Marital Status:     Intimate Partner Violence: Not At Risk (3/4/2024)    Received from [facility]    Humiliation, Afraid, Rape, and Kick questionnaire    • Fear of Current or Ex-Partner: No    • Emotionally Abused: No    • Physically Abused: No    • Sexually Abused: No   Housing Stability: Unknown (2/29/2024)    Received from [facility]    Housing Stability Vital Sign    • Unable to Pay for Housing in the Last Year: No    • Number of Places Lived in the Last Year: Not on file    • Unstable Housing in the Last Year: Not on file     Family History   Problem Relation Age of Onset   • Dementia Mother    • No Known Problems Father    • Prostate cancer Maternal Grandfather      No Known Allergies  Current Outpatient Medications on File Prior to Visit   Medication Sig Dispense Refill   • carbidopa-levodopa (SINEMET)  mg per tablet Take 1 tablet by mouth daily     • DULoxetine HCl 40 MG CPEP 60 mg     • Naproxen Sodium (ALEVE) 220 MG CAPS Take by mouth if needed     • nortriptyline (PAMELOR) 50 mg capsule Take 50 mg by mouth daily at bedtime     • topiramate (TOPAMAX) 200 MG tablet Take 200 mg by mouth daily     • cyclobenzaprine (FLEXERIL) 5 mg tablet 5 mg if needed (Patient not taking: Reported on 4/29/2024)     • [DISCONTINUED] Aimovig 140 MG/ML SOAJ INJECT 1ML SUBCUTANEOUSLY EVERY MONTH IN THE ABDOMEN THIGH (Patient not taking: Reported on 4/29/2024) 1 mL 0     No current facility-administered medications on file prior to visit.         Review of Systems   Refer to positive review of systems in HPI.   Review of Systems    Constitutional- No fever  Eyes- No visual change  ENT- Hearing normal  CV- No chest pain  Resp- No Shortness of breath  GI- No diarrhea  - Bladder normal  MS- No Arthritis   Skin- No rash  Psych- No depression  Endo- No DM  Heme- No nodes    Vitals:    04/29/24 1547   BP: 106/78   BP Location: Left arm   Patient Position: Sitting   Cuff Size: Standard   Pulse: 102   Temp: (!) 97.4 °F (36.3 °C)  "  TempSrc: Tympanic   SpO2: 96%   Weight: 96.6 kg (213 lb)   Height: 5' 1\" (1.549 m)       PHYSICAL EXAM:  Appearance: No Acute Distress  Ophthalmoscopic: Disc Flat, Normal fundus  Mental status:  Orientation: Awake, Alert, and Orientedx3  Memory: Registation 3/3 Recall 3/3  Attention: normal  Knowledge: good  Language: No aphasia  Speech: No dysarthria  Cranial Nerves:  2 No Visual Defect on Confrontation, Pupils round, equal, reactive to light  3,4,6 Extraocular Movements Intact, no nystagmus  5 Facial Sensation Intact  7 No facial asymmetry  8 Intact hearing  9,10 Palate symmetric, normal gag  11 Good shoulder shrug  12 Tongue Midline  Gait: Stable  Coordination: No ataxia with finger to nose testing, and heel to shin  Sensory: Intact, Symmetric to pinprick, light touch, vibration, and joint position  Muscle Tone: Normal              Muscle exam:  Arm Right Left Leg Right Left   Deltoid 5/5 5/5 Iliopsoas 5/5 5/5   Biceps 5/5 5/5 Quads 5/5 5/5   Triceps 5/5 5/5 Hamstrings 5/5 5/5   Wrist Extension 5/5 5/5 Ankle Dorsi Flexion 5/5 5/5   Wrist Flexion 5/5 5/5 Ankle Plantar Flexion 5/5 5/5   Interossei 5/5 5/5 Ankle Eversion 5/5 5/5   APB 5/5 5/5 Ankle Inversion 5/5 5/5       Reflexes   RJ BJ TJ KJ AJ Plantars Sauceda's   Right 2+ 2+ 2+ 2+  Downgoing Not present   Left 2+ 2+ 2+ 2+  Downgoing Not present     Personal review of  Labs:                  Diagnoses and all orders for this visit:      1. Atypical migraine  Aimovig 140 MG/ML SOAJ      2. DDD (degenerative disc disease), cervical        3. Cervicogenic headache            Patient has been off of her CGRP product and hasn't been taking it. Refilled and discussed that if there is problem to directly contact us.   She's on sinemet for RLS.   She sees pain management for cervical DDD.               Total time of encounter:  30 min  More than 50% of the time was used in counseling and/or coordination of care  Extent of counseling and/or coordination of " care        Sanjana Parsons MD  [facility]  [address] [place] [id number] [phone]

## 2024-05-03 ENCOUNTER — TELEPHONE (OUTPATIENT)
Age: 54
End: 2024-05-03

## 2024-05-03 NOTE — TELEPHONE ENCOUNTER
Agent [facility] [place] called to find out why patient is receiving bill for 2,000$ I did ask if he had the DOS or appointment is in reference to but he did not.     Transferred to billing

## (undated) DEVICE — CHLORAPREP APPLICATOR TINTED 10.5ML ONE-STEP

## (undated) DEVICE — SYRINGE LUER LOK 7ML LOSS OF RESISTANCE

## (undated) DEVICE — SYRINGE 10ML LL CONTROL TOP

## (undated) DEVICE — TRAY PAIN SUPPORT

## (undated) DEVICE — NEEDLE BLUNT 18 G X 1 1/2 W FILTER

## (undated) DEVICE — IODOFORM PACKING STRIP: Brand: CURITY

## (undated) DEVICE — CULTURE TUBE ANAEROBIC

## (undated) DEVICE — LABEL STERILE (RSC) (2-SENSOR CAINE 2-LIDOCAINE 2-HEPARIN)

## (undated) DEVICE — PLASTIC ADHESIVE BANDAGE: Brand: CURITY

## (undated) DEVICE — ASTOUND IMPERVIOUS SURGICAL GOWN: Brand: CONVERTORS

## (undated) DEVICE — GLOVE SRG BIOGEL 7.5

## (undated) DEVICE — TOWEL SET X-RAY

## (undated) DEVICE — RADIOLOGY STERILE LABELS: Brand: CENTURION

## (undated) DEVICE — SYRINGE 5ML LL

## (undated) DEVICE — TRAY EPIDURAL PERIFIX 20GA X 3.5IN TUOHY 8ML

## (undated) DEVICE — IV SET EXT SM BORE CARESITE 8IN

## (undated) DEVICE — GAUZE SPONGES,USP TYPE VII GAUZE, 12 PLY: Brand: CURITY

## (undated) DEVICE — GLOVE SRG BIOGEL 8

## (undated) DEVICE — PACK GENERAL LF

## (undated) DEVICE — NEEDLE SPINAL 25G X 3.5 IN QUINCKE

## (undated) DEVICE — NEEDLE 25G X 1 1/2

## (undated) DEVICE — GROUNDING PAD UNIVERSAL SLW

## (undated) DEVICE — SYRINGE 10ML LL

## (undated) DEVICE — TIBURON LAPAROTOMY DRAPE: Brand: CONVERTORS

## (undated) DEVICE — SYRINGE 3ML LL

## (undated) DEVICE — SCD SEQUENTIAL COMPRESSION COMFORT SLEEVE MEDIUM KNEE LENGTH: Brand: KENDALL SCD

## (undated) DEVICE — BASIC DOUBLE BASIN 2-LF: Brand: MEDLINE INDUSTRIES, INC.

## (undated) DEVICE — FLEXIBLE ADHESIVE BANDAGE,X-LARGE: Brand: CURITY

## (undated) DEVICE — GLOVE INDICATOR PI UNDERGLOVE SZ 8.5 BLUE